# Patient Record
Sex: MALE | Race: WHITE | NOT HISPANIC OR LATINO | URBAN - METROPOLITAN AREA
[De-identification: names, ages, dates, MRNs, and addresses within clinical notes are randomized per-mention and may not be internally consistent; named-entity substitution may affect disease eponyms.]

---

## 2020-07-11 ENCOUNTER — INPATIENT (INPATIENT)
Facility: HOSPITAL | Age: 75
LOS: 8 days | Discharge: NURSING, REHAB OR LONG-TERM CARE | DRG: 689 | End: 2020-07-20
Attending: INTERNAL MEDICINE | Admitting: INTERNAL MEDICINE
Payer: MEDICARE

## 2020-07-11 VITALS
OXYGEN SATURATION: 94 % | RESPIRATION RATE: 22 BRPM | WEIGHT: 110.01 LBS | DIASTOLIC BLOOD PRESSURE: 93 MMHG | HEIGHT: 65 IN | HEART RATE: 51 BPM | SYSTOLIC BLOOD PRESSURE: 208 MMHG

## 2020-07-11 DIAGNOSIS — Z29.9 ENCOUNTER FOR PROPHYLACTIC MEASURES, UNSPECIFIED: ICD-10-CM

## 2020-07-11 DIAGNOSIS — R53.1 WEAKNESS: ICD-10-CM

## 2020-07-11 DIAGNOSIS — N39.0 URINARY TRACT INFECTION, SITE NOT SPECIFIED: ICD-10-CM

## 2020-07-11 LAB
ALBUMIN SERPL ELPH-MCNC: 3.3 G/DL — LOW (ref 3.5–5)
ALP SERPL-CCNC: 60 U/L — SIGNIFICANT CHANGE UP (ref 40–120)
ALT FLD-CCNC: 20 U/L DA — SIGNIFICANT CHANGE UP (ref 10–60)
ANION GAP SERPL CALC-SCNC: 9 MMOL/L — SIGNIFICANT CHANGE UP (ref 5–17)
APPEARANCE UR: ABNORMAL
APTT BLD: 30 SEC — SIGNIFICANT CHANGE UP (ref 27.5–35.5)
AST SERPL-CCNC: 21 U/L — SIGNIFICANT CHANGE UP (ref 10–40)
BACTERIA # UR AUTO: ABNORMAL /HPF
BASE EXCESS BLDV CALC-SCNC: 4.6 MMOL/L — HIGH (ref -2–2)
BILIRUB SERPL-MCNC: 0.6 MG/DL — SIGNIFICANT CHANGE UP (ref 0.2–1.2)
BILIRUB UR-MCNC: NEGATIVE — SIGNIFICANT CHANGE UP
BUN SERPL-MCNC: 28 MG/DL — HIGH (ref 7–18)
CALCIUM SERPL-MCNC: 8.5 MG/DL — SIGNIFICANT CHANGE UP (ref 8.4–10.5)
CHLORIDE SERPL-SCNC: 100 MMOL/L — SIGNIFICANT CHANGE UP (ref 96–108)
CO2 SERPL-SCNC: 26 MMOL/L — SIGNIFICANT CHANGE UP (ref 22–31)
COLOR SPEC: YELLOW — SIGNIFICANT CHANGE UP
COMMENT - URINE: SIGNIFICANT CHANGE UP
CREAT SERPL-MCNC: 0.83 MG/DL — SIGNIFICANT CHANGE UP (ref 0.5–1.3)
DIFF PNL FLD: ABNORMAL
EPI CELLS # UR: SIGNIFICANT CHANGE UP /HPF
GLUCOSE SERPL-MCNC: 92 MG/DL — SIGNIFICANT CHANGE UP (ref 70–99)
GLUCOSE UR QL: NEGATIVE — SIGNIFICANT CHANGE UP
HCO3 BLDV-SCNC: 29 MMOL/L — SIGNIFICANT CHANGE UP (ref 21–29)
HCT VFR BLD CALC: 43.3 % — SIGNIFICANT CHANGE UP (ref 39–50)
HGB BLD-MCNC: 14.9 G/DL — SIGNIFICANT CHANGE UP (ref 13–17)
HOROWITZ INDEX BLDV+IHG-RTO: 21 — SIGNIFICANT CHANGE UP
INR BLD: 1.13 RATIO — SIGNIFICANT CHANGE UP (ref 0.88–1.16)
KETONES UR-MCNC: ABNORMAL
LACTATE SERPL-SCNC: 0.7 MMOL/L — SIGNIFICANT CHANGE UP (ref 0.7–2)
LEUKOCYTE ESTERASE UR-ACNC: ABNORMAL
MAGNESIUM SERPL-MCNC: 2.3 MG/DL — SIGNIFICANT CHANGE UP (ref 1.6–2.6)
MCHC RBC-ENTMCNC: 29.7 PG — SIGNIFICANT CHANGE UP (ref 27–34)
MCHC RBC-ENTMCNC: 34.4 GM/DL — SIGNIFICANT CHANGE UP (ref 32–36)
MCV RBC AUTO: 86.3 FL — SIGNIFICANT CHANGE UP (ref 80–100)
NITRITE UR-MCNC: NEGATIVE — SIGNIFICANT CHANGE UP
NRBC # BLD: 0 /100 WBCS — SIGNIFICANT CHANGE UP (ref 0–0)
NT-PROBNP SERPL-SCNC: 2428 PG/ML — HIGH (ref 0–125)
PCO2 BLDV: 42 MMHG — SIGNIFICANT CHANGE UP (ref 35–50)
PH BLDV: 7.45 — SIGNIFICANT CHANGE UP (ref 7.35–7.45)
PH UR: 8 — SIGNIFICANT CHANGE UP (ref 5–8)
PHOSPHATE SERPL-MCNC: 3.6 MG/DL — SIGNIFICANT CHANGE UP (ref 2.5–4.5)
PLATELET # BLD AUTO: 235 K/UL — SIGNIFICANT CHANGE UP (ref 150–400)
PO2 BLDV: 36 MMHG — SIGNIFICANT CHANGE UP (ref 25–45)
POTASSIUM SERPL-MCNC: 3.9 MMOL/L — SIGNIFICANT CHANGE UP (ref 3.5–5.3)
POTASSIUM SERPL-SCNC: 3.9 MMOL/L — SIGNIFICANT CHANGE UP (ref 3.5–5.3)
PROT SERPL-MCNC: 7.1 G/DL — SIGNIFICANT CHANGE UP (ref 6–8.3)
PROT UR-MCNC: 100
PROTHROM AB SERPL-ACNC: 13.1 SEC — SIGNIFICANT CHANGE UP (ref 10.6–13.6)
RBC # BLD: 5.02 M/UL — SIGNIFICANT CHANGE UP (ref 4.2–5.8)
RBC # FLD: 12.8 % — SIGNIFICANT CHANGE UP (ref 10.3–14.5)
RBC CASTS # UR COMP ASSIST: ABNORMAL /HPF (ref 0–2)
SAO2 % BLDV: 67 % — SIGNIFICANT CHANGE UP (ref 67–88)
SARS-COV-2 IGG SERPL QL IA: NEGATIVE — SIGNIFICANT CHANGE UP
SARS-COV-2 IGM SERPL IA-ACNC: 0.09 INDEX — SIGNIFICANT CHANGE UP
SODIUM SERPL-SCNC: 135 MMOL/L — SIGNIFICANT CHANGE UP (ref 135–145)
SP GR SPEC: 1.01 — SIGNIFICANT CHANGE UP (ref 1.01–1.02)
TROPONIN I SERPL-MCNC: 0.02 NG/ML — SIGNIFICANT CHANGE UP (ref 0–0.04)
UROBILINOGEN FLD QL: NEGATIVE — SIGNIFICANT CHANGE UP
WBC # BLD: 11.4 K/UL — HIGH (ref 3.8–10.5)
WBC # FLD AUTO: 11.4 K/UL — HIGH (ref 3.8–10.5)
WBC UR QL: ABNORMAL /HPF (ref 0–5)

## 2020-07-11 PROCEDURE — 71045 X-RAY EXAM CHEST 1 VIEW: CPT | Mod: 26

## 2020-07-11 PROCEDURE — 99285 EMERGENCY DEPT VISIT HI MDM: CPT | Mod: 25

## 2020-07-11 RX ORDER — AMLODIPINE BESYLATE 2.5 MG/1
5 TABLET ORAL DAILY
Refills: 0 | Status: DISCONTINUED | OUTPATIENT
Start: 2020-07-11 | End: 2020-07-12

## 2020-07-11 RX ORDER — ENOXAPARIN SODIUM 100 MG/ML
40 INJECTION SUBCUTANEOUS DAILY
Refills: 0 | Status: DISCONTINUED | OUTPATIENT
Start: 2020-07-11 | End: 2020-07-20

## 2020-07-11 RX ORDER — ACETAMINOPHEN 500 MG
975 TABLET ORAL ONCE
Refills: 0 | Status: COMPLETED | OUTPATIENT
Start: 2020-07-11 | End: 2020-07-11

## 2020-07-11 RX ORDER — CEFTRIAXONE 500 MG/1
1000 INJECTION, POWDER, FOR SOLUTION INTRAMUSCULAR; INTRAVENOUS EVERY 24 HOURS
Refills: 0 | Status: DISCONTINUED | OUTPATIENT
Start: 2020-07-11 | End: 2020-07-15

## 2020-07-11 RX ORDER — CEFEPIME 1 G/1
1000 INJECTION, POWDER, FOR SOLUTION INTRAMUSCULAR; INTRAVENOUS ONCE
Refills: 0 | Status: COMPLETED | OUTPATIENT
Start: 2020-07-11 | End: 2020-07-11

## 2020-07-11 RX ADMIN — CEFEPIME 100 MILLIGRAM(S): 1 INJECTION, POWDER, FOR SOLUTION INTRAMUSCULAR; INTRAVENOUS at 11:01

## 2020-07-11 RX ADMIN — CEFTRIAXONE 100 MILLIGRAM(S): 500 INJECTION, POWDER, FOR SOLUTION INTRAMUSCULAR; INTRAVENOUS at 22:26

## 2020-07-11 RX ADMIN — Medication 975 MILLIGRAM(S): at 11:01

## 2020-07-11 NOTE — H&P ADULT - ATTENDING COMMENTS
HPI:    74 year old male with no significant medical history, lives at home, walks with cane presented to ED after he was feeling weak for 3 days. Patient says he does not take any medications and does not follow with any PCP.  Patient denies medical problems in past, does not go for routine screening test. Patient denies abdominal pain, fever, nausea,, pain, cough, palpitations , nausea , vomiting, bowel or urinary problems. Patient denies any numbness, weakness, dizziness , head ache or any other problems.    # URINARY TRACT INFECTION - PLACED ON ROCEPHIN, F/U UCX, BCX, CONDOM CATHETER PLACED, F/U BLADDER SCAN  # WEAKNESS SUSPECTED TO BE SECONDARY TO INFECTION - F/U VITB12, VIT D, TSH, FOLATE, HBA1C  # MODERATE PCM  # F/U PT/OT EVALUATION  # GI AND DVT PPX HPI:    74 year old male with no significant medical history, lives at home, walks with cane presented to ED after he was feeling weak for 3 days. Patient says he does not take any medications and does not follow with any PCP.  Patient denies medical problems in past, does not go for routine screening test. Patient denies abdominal pain, fever, nausea,, pain, cough, palpitations , nausea , vomiting, bowel or urinary problems. Patient denies any numbness, weakness, dizziness , head ache or any other problems.    # URINARY TRACT INFECTION - PLACED ON ROCEPHIN, F/U UCX, BCX, CONDOM CATHETER PLACED, F/U BLADDER SCAN  # WEAKNESS SUSPECTED TO BE SECONDARY TO INFECTION - F/U VITB12, VIT D, TSH, FOLATE, HBA1C  # MODERATE PCM  # F/U PT/OT EVALUATION  # GI AND DVT PPX    UNIQUE GILL M.D. COVERING FOR NITISH GILL M.D.

## 2020-07-11 NOTE — H&P ADULT - PROBLEM SELECTOR PLAN 1
Patient presented after weakness.  UA is positive for WBC 11-25, Leukocyte esterase moderate.  s/p condem catheter placement in ED  -WBC 11.40.  - Follow urine cultures.  -will start the patient on Rocephin.

## 2020-07-11 NOTE — H&P ADULT - PROBLEM SELECTOR PLAN 3
IMPROVE VTE Individual Risk Assessment  RISK                                                                Points  [  ] Previous VTE                                                  3  [  ] Thrombophilia                                               2  [  ] Lower limb paralysis                                      2        (unable to hold up >15 seconds)    [  ] Current Cancer                                              2         (within 6 months)  [x  ] Immobilization > 24 hrs                                1  [  ] ICU/CCU stay > 24 hours                              1  [x  ] Age > 60                                                      1  IMPROVE VTE Score : 2  will start the patient 0n lovenox.

## 2020-07-11 NOTE — H&P ADULT - PROBLEM SELECTOR PLAN 2
Patient presented with Weakness.  can be secondary to infection.  follow urine and blood cx.  -PT eval  check TSH, HbA1c, vitamin B 12, Folate, vitamin D levels.

## 2020-07-11 NOTE — ED PROVIDER NOTE - PHYSICAL EXAMINATION
GENERAL: well appearing, no acute distress   HEAD: atraumatic   EYES: EOMI, pink conjunctiva   ENT: moist oral mucosa   CARDIAC: RRR, no edema, distal pulses present   RESPIRATORY: lungs CTAB, no increased work of breathing   GASTROINTESTINAL: no abdominal tenderness, no rebound or guarding, bowel sounds presents  GENITOURINARY: no CVA tenderness   MUSCULOSKELETAL: no deformity   NEUROLOGICAL: alert, oriented to person and situation but not time, following commands, strength and sensation intact, no ataxia    SKIN: intact   PSYCHIATRIC: cooperative  HEME LYMPH: no lymphadenopathy

## 2020-07-11 NOTE — H&P ADULT - NSHPPHYSICALEXAM_GEN_ALL_CORE
Vital Signs Last 24 Hrs  T(C): 37.7 (11 Jul 2020 10:22), Max: 37.7 (11 Jul 2020 10:22)  T(F): 99.9 (11 Jul 2020 10:22), Max: 99.9 (11 Jul 2020 10:22)  HR: 73 (11 Jul 2020 11:03) (51 - 73)  BP: 157/77 (11 Jul 2020 11:03) (157/77 - 208/93)  BP(mean): --  RR: 18 (11 Jul 2020 11:03) (18 - 22)  SpO2: 96% (11 Jul 2020 11:03) (94% - 96%)    CONSTITUTIONAL: Elderly male in no apparent distress.  ENMT: Airway patent, Nasal mucosa clear. Mouth with normal mucosa.  EYES: Clear bilaterally, pupils equal, round and reactive to light. EOMI.  CARDIAC: Normal rate, regular rhythm.  Heart sounds S1, S2.  No murmurs, rubs or gallops   RESPIRATORY: Breath sounds clear and equal bilaterally. No wheezes, rales or rhonchi  MUSCULOSKELETAL: Spine appears normal, range of motion is not limited,   ABDOMEN; soft, non tender, non distended.  EXTREMITIES: No edema, cyanosis or deformity   NEUROLOGICAL: Alert and oriented, no new deficits.  SKIN: No rash, skin turgor

## 2020-07-11 NOTE — ED PROVIDER NOTE - PROGRESS NOTE DETAILS
EKG - nsr, rate 53, 1st deg AV block, inferior Q waves, lateral twi EKG - nsr, rate 53, 1st deg AV block, inferior Q waves, lateral twi  labs - WBC 11, bnp 2000  CXR - lungs clear   VS improved w/o intervention. Will admit to unattached Dr Moss for weakness. Endorsed to MAR.

## 2020-07-11 NOTE — H&P ADULT - ASSESSMENT
74 year old male with no significant medical history, lives at home, walks with cane presented to ED after he was feeling weak for 3 days. Patient says he does not take any medications and does not follow with any PCP. Patient is admitted for treatment for UTI.

## 2020-07-11 NOTE — ED PROVIDER NOTE - NS ED ROS FT
CONSTITUTIONAL: no fever, no chills   EYES: no visual changes, no eye pain   ENMT: no nasal congestion, no throat pain  CARDIOVASCULAR: no chest pain, no edema, no palpitations   RESPIRATORY: no shortness of breath, no cough   GASTROINTESTINAL: no abdominal pain, no nausea, no vomiting, no diarrhea, no constipation   GENITOURINARY: no dysuria, no frequency  MUSCULOSKELETAL: no joint pains, no myalgias, no back pain   SKIN: no rashes  NEUROLOGICAL: +weakness, no headache, no dizziness, no slurred speech, no syncope   PSYCHIATRIC: no known mental health illness   HEME/LYMPH: no lymphadenopathy      All other ROS negative except as per HPI 36.7

## 2020-07-11 NOTE — ED PROVIDER NOTE - DATE/TIME 1
[Dear  ___] : Dear  [unfilled], [Courtesy Letter:] : I had the pleasure of seeing your patient, [unfilled], in my office today. [Please see my note below.] : Please see my note below. [Referral Closing:] : Thank you very much for seeing this patient.  If you have any questions, please do not hesitate to contact me. [Sincerely,] : Sincerely, [FreeTextEntry3] : Dr. Evelia Martinez MD\par Fellow\par Department of Hematology, Oncology, and Bone Marrow Transplant\par [facility]\par \par [facility]\par \par Gris Laboy MD, MPH\par Head, Developmental [employer]\par Pediatric Hematology-Oncology and Stem Cell Transplant\par [facility] \par , Department of Pediatrics\par [facility] \par Tel: [phone]\par Email: [email or web address] [FreeTextEntry2] : Dr. Lachelle Candelario, \par [address], [address]\[place] [address] 11-Jul-2020 11:49

## 2020-07-11 NOTE — ED ADULT TRIAGE NOTE - CHIEF COMPLAINT QUOTE
As per brother patient has not been eating much and too weak to get out of bed most days.  Both brothers unkempt and domicile is untidy as per EMS.  Placed in iso room in case of infestation.  EKG/FS/CORE temp requested in main ED.  Will request SW

## 2020-07-11 NOTE — ED ADULT NURSE NOTE - OBJECTIVE STATEMENT
Patient came to the ED alert and verbally responsive for generalized weakness. Pt appears to be disheveled at triage, pt was cleaned, undress and placed on a gown. Patient came to the ED alert and verbally responsive for generalized weakness. Pt appears to be disheveled at triage, pt was cleaned, undress and placed on a gown. Scrotal area + swelling, + redness

## 2020-07-12 ENCOUNTER — TRANSCRIPTION ENCOUNTER (OUTPATIENT)
Age: 75
End: 2020-07-12

## 2020-07-12 LAB
ALBUMIN SERPL ELPH-MCNC: 3.4 G/DL — LOW (ref 3.5–5)
ALP SERPL-CCNC: 61 U/L — SIGNIFICANT CHANGE UP (ref 40–120)
ALT FLD-CCNC: 19 U/L DA — SIGNIFICANT CHANGE UP (ref 10–60)
ANION GAP SERPL CALC-SCNC: 10 MMOL/L — SIGNIFICANT CHANGE UP (ref 5–17)
AST SERPL-CCNC: 18 U/L — SIGNIFICANT CHANGE UP (ref 10–40)
BASOPHILS # BLD AUTO: 0.03 K/UL — SIGNIFICANT CHANGE UP (ref 0–0.2)
BASOPHILS NFR BLD AUTO: 0.2 % — SIGNIFICANT CHANGE UP (ref 0–2)
BILIRUB SERPL-MCNC: 0.7 MG/DL — SIGNIFICANT CHANGE UP (ref 0.2–1.2)
BUN SERPL-MCNC: 24 MG/DL — HIGH (ref 7–18)
CALCIUM SERPL-MCNC: 8.7 MG/DL — SIGNIFICANT CHANGE UP (ref 8.4–10.5)
CHLORIDE SERPL-SCNC: 100 MMOL/L — SIGNIFICANT CHANGE UP (ref 96–108)
CHOLEST SERPL-MCNC: 145 MG/DL — SIGNIFICANT CHANGE UP (ref 10–199)
CO2 SERPL-SCNC: 23 MMOL/L — SIGNIFICANT CHANGE UP (ref 22–31)
CREAT SERPL-MCNC: 0.82 MG/DL — SIGNIFICANT CHANGE UP (ref 0.5–1.3)
EOSINOPHIL # BLD AUTO: 0.07 K/UL — SIGNIFICANT CHANGE UP (ref 0–0.5)
EOSINOPHIL NFR BLD AUTO: 0.5 % — SIGNIFICANT CHANGE UP (ref 0–6)
GLUCOSE SERPL-MCNC: 99 MG/DL — SIGNIFICANT CHANGE UP (ref 70–99)
HCT VFR BLD CALC: 46 % — SIGNIFICANT CHANGE UP (ref 39–50)
HDLC SERPL-MCNC: 53 MG/DL — SIGNIFICANT CHANGE UP
HGB BLD-MCNC: 15.6 G/DL — SIGNIFICANT CHANGE UP (ref 13–17)
IMM GRANULOCYTES NFR BLD AUTO: 0.4 % — SIGNIFICANT CHANGE UP (ref 0–1.5)
LIPID PNL WITH DIRECT LDL SERPL: 81 MG/DL — SIGNIFICANT CHANGE UP
LYMPHOCYTES # BLD AUTO: 0.73 K/UL — LOW (ref 1–3.3)
LYMPHOCYTES # BLD AUTO: 5.2 % — LOW (ref 13–44)
MAGNESIUM SERPL-MCNC: 2.4 MG/DL — SIGNIFICANT CHANGE UP (ref 1.6–2.6)
MCHC RBC-ENTMCNC: 29.8 PG — SIGNIFICANT CHANGE UP (ref 27–34)
MCHC RBC-ENTMCNC: 33.9 GM/DL — SIGNIFICANT CHANGE UP (ref 32–36)
MCV RBC AUTO: 87.8 FL — SIGNIFICANT CHANGE UP (ref 80–100)
MONOCYTES # BLD AUTO: 1.2 K/UL — HIGH (ref 0–0.9)
MONOCYTES NFR BLD AUTO: 8.6 % — SIGNIFICANT CHANGE UP (ref 2–14)
NEUTROPHILS # BLD AUTO: 11.92 K/UL — HIGH (ref 1.8–7.4)
NEUTROPHILS NFR BLD AUTO: 85.1 % — HIGH (ref 43–77)
NRBC # BLD: 0 /100 WBCS — SIGNIFICANT CHANGE UP (ref 0–0)
PHOSPHATE SERPL-MCNC: 3.9 MG/DL — SIGNIFICANT CHANGE UP (ref 2.5–4.5)
PLATELET # BLD AUTO: 248 K/UL — SIGNIFICANT CHANGE UP (ref 150–400)
POTASSIUM SERPL-MCNC: 3.4 MMOL/L — LOW (ref 3.5–5.3)
POTASSIUM SERPL-SCNC: 3.4 MMOL/L — LOW (ref 3.5–5.3)
PROT SERPL-MCNC: 6.9 G/DL — SIGNIFICANT CHANGE UP (ref 6–8.3)
RBC # BLD: 5.24 M/UL — SIGNIFICANT CHANGE UP (ref 4.2–5.8)
RBC # FLD: 12.7 % — SIGNIFICANT CHANGE UP (ref 10.3–14.5)
SARS-COV-2 RNA SPEC QL NAA+PROBE: SIGNIFICANT CHANGE UP
SODIUM SERPL-SCNC: 133 MMOL/L — LOW (ref 135–145)
TOTAL CHOLESTEROL/HDL RATIO MEASUREMENT: 2.7 RATIO — LOW (ref 3.4–9.6)
TRIGL SERPL-MCNC: 54 MG/DL — SIGNIFICANT CHANGE UP (ref 10–149)
TSH SERPL-MCNC: 1.39 UU/ML — SIGNIFICANT CHANGE UP (ref 0.34–4.82)
WBC # BLD: 14.01 K/UL — HIGH (ref 3.8–10.5)
WBC # FLD AUTO: 14.01 K/UL — HIGH (ref 3.8–10.5)

## 2020-07-12 PROCEDURE — 74019 RADEX ABDOMEN 2 VIEWS: CPT | Mod: 26

## 2020-07-12 RX ORDER — OXYCODONE AND ACETAMINOPHEN 5; 325 MG/1; MG/1
2 TABLET ORAL EVERY 6 HOURS
Refills: 0 | Status: DISCONTINUED | OUTPATIENT
Start: 2020-07-12 | End: 2020-07-18

## 2020-07-12 RX ORDER — POTASSIUM CHLORIDE 20 MEQ
40 PACKET (EA) ORAL ONCE
Refills: 0 | Status: COMPLETED | OUTPATIENT
Start: 2020-07-12 | End: 2020-07-12

## 2020-07-12 RX ORDER — OXYCODONE AND ACETAMINOPHEN 5; 325 MG/1; MG/1
1 TABLET ORAL EVERY 4 HOURS
Refills: 0 | Status: DISCONTINUED | OUTPATIENT
Start: 2020-07-12 | End: 2020-07-18

## 2020-07-12 RX ORDER — ACETAMINOPHEN 500 MG
650 TABLET ORAL EVERY 6 HOURS
Refills: 0 | Status: DISCONTINUED | OUTPATIENT
Start: 2020-07-12 | End: 2020-07-20

## 2020-07-12 RX ORDER — POLYETHYLENE GLYCOL 3350 17 G/17G
17 POWDER, FOR SOLUTION ORAL DAILY
Refills: 0 | Status: DISCONTINUED | OUTPATIENT
Start: 2020-07-12 | End: 2020-07-13

## 2020-07-12 RX ORDER — POTASSIUM CHLORIDE 20 MEQ
10 PACKET (EA) ORAL
Refills: 0 | Status: COMPLETED | OUTPATIENT
Start: 2020-07-12 | End: 2020-07-12

## 2020-07-12 RX ORDER — AMLODIPINE BESYLATE 2.5 MG/1
10 TABLET ORAL DAILY
Refills: 0 | Status: DISCONTINUED | OUTPATIENT
Start: 2020-07-13 | End: 2020-07-20

## 2020-07-12 RX ORDER — LOSARTAN POTASSIUM 100 MG/1
25 TABLET, FILM COATED ORAL ONCE
Refills: 0 | Status: COMPLETED | OUTPATIENT
Start: 2020-07-12 | End: 2020-07-12

## 2020-07-12 RX ADMIN — Medication 650 MILLIGRAM(S): at 04:43

## 2020-07-12 RX ADMIN — Medication 100 MILLIEQUIVALENT(S): at 09:42

## 2020-07-12 RX ADMIN — OXYCODONE AND ACETAMINOPHEN 1 TABLET(S): 5; 325 TABLET ORAL at 20:48

## 2020-07-12 RX ADMIN — OXYCODONE AND ACETAMINOPHEN 1 TABLET(S): 5; 325 TABLET ORAL at 04:42

## 2020-07-12 RX ADMIN — POLYETHYLENE GLYCOL 3350 17 GRAM(S): 17 POWDER, FOR SOLUTION ORAL at 12:40

## 2020-07-12 RX ADMIN — Medication 100 MILLIEQUIVALENT(S): at 11:46

## 2020-07-12 RX ADMIN — Medication 100 MILLIEQUIVALENT(S): at 10:47

## 2020-07-12 NOTE — DISCHARGE NOTE PROVIDER - NSDCCPCAREPLAN_GEN_ALL_CORE_FT
PRINCIPAL DISCHARGE DIAGNOSIS  Diagnosis: Weakness  Assessment and Plan of Treatment:       SECONDARY DISCHARGE DIAGNOSES  Diagnosis: UTI (urinary tract infection)  Assessment and Plan of Treatment: Continue taking your antibiotics as prescribed and monitor for signs and symptoms of infection, such as fever or chills, burning/pain with urination, urinary frequency/hesitancy, cloudy or bloody urine. PRINCIPAL DISCHARGE DIAGNOSIS  Diagnosis: UTI (urinary tract infection)  Assessment and Plan of Treatment: Labs on admission was notable for UTI and was started on IV Rocephin antibiotic. Urine culture was positive for Enterococcus faecalis which was sensitive to Cipro. Blood cultures were negative. Chest x-ray was negative.   Continue Cipro oral antibiotic 500mg twice a day for 7 more days to complete course. Follow up with your PCP for further management.      SECONDARY DISCHARGE DIAGNOSES  Diagnosis: Bilateral hydronephrosis  Assessment and Plan of Treatment: Renal US showed each kidney measures 10.2 cm long dimension. There is mild right and moderate left hydronephrosis. No shadowing renal calculus solid or cystic mass bilaterally. No perirenal collections.  Staley was placed by urology team. Started on flomax. Testicular US done showed normal bilateral testes. Large right hydrocele. CT abdomen showed markedly enlarged prostate. Enhancing mass at the bladder base may represent intravesicular extension of the prostate however bladder mass cannot be excluded. Recommend cystoscopy. Mild right hydroureteronephrosis. Large right extrarenal pelvis. Small left extrarenal pelvis.  Urology recommended to continue staley upon discharge and follow up with Dr. Sanchez as outpatient for cystoscopy and further management. Follow up with PCP for further evaluation and management.    Diagnosis: Scrotal hernia  Assessment and Plan of Treatment: Testicular US done showed normal bilateral testes. Large right hydrocele. Large bowel containing left inguinal-scrotal hernia.  Surgery team consulted for evaluation of hernia and recommended for nonemergent surgery as outpatient.    Follow up with your PCP. Follow up with surgery team if needed for surgery evaluation of hernia.    Diagnosis: Constipation  Assessment and Plan of Treatment: X-ray abdomen showed colonic stool present and treated with bowel   regimen. Repeat x-ray abdomen showed improvement in symptoms. Recommend to continue bowel regimen to prevent constipation. Follow up with your PCP for further management    Diagnosis: Urinary retention  Assessment and Plan of Treatment: Staley placed by urology team and started on flomax for urinary retention. Recommend to keep staley in place and follow up with urologist, Dr. Sanchez as outpatient for further evaluation and for staley removal.    Diagnosis: Pancreatic cyst  Assessment and Plan of Treatment: CT abdomen 9 mm pancreatic body cyst.    MRI done to evaluate pancreatic cysts showed 1.4 cm and 3.0 cm cysts in the pancreatic body and uncinate process, respectively, without concerning features or high-risk stigmata. Initial MRI surveillance every 6 months for a total of 2 years is recommended. If stable, the follow-up interval can be increased. Follow up with your PCP for further management.

## 2020-07-12 NOTE — PHYSICAL THERAPY INITIAL EVALUATION ADULT - ADDITIONAL COMMENTS
Patient reports living in a 2 story private house with pt on 2nd floor and a brother lives in the first floor. Patient reports being independent with ADL's, occasionally uses cane for community ambulation, no assistive device within home. There are 14 steps to access second floor but once on the 2nd floor, bedroom, toilet and kitchen are all in same floor level.

## 2020-07-12 NOTE — CHART NOTE - NSCHARTNOTEFT_GEN_A_CORE
Patient noted to have a mild bleeding from urethral meatus. He is not in acute distress. Also, severe testicular swelling is noted. Testicular u/s is ordered by primary team. Patient noted to have a mild bleeding from urethral meatus. He is not in acute distress. Also, severe testicular swelling is noted. Testicular u/s is already ordered by primary team.    Patient BP remained elevated after the initial encounter. Losartan 25mg stat, and increased amlodipine to 10mg for tomorrow am. Patient noted to have bleeding from the urethral meatus throughout the night. He is not in acute distress. Also, severe testicular swelling is noted. Testicular u/s is already ordered by primary team.    Patient BP remained elevated after the initial encounter. Losartan 25mg stat, and increased amlodipine to 10mg for tomorrow am. Patient noted to have a mild bleeding from the urethral meatus. He is not in acute distress. Also, severe testicular swelling is noted. Testicular u/s is already ordered by primary team.    Patient BP remained elevated after the initial encounter. Losartan 25mg stat, and increased amlodipine to 10mg for tomorrow am.    UPDATE  - patient had more bleeding from the urethral meatus throughout the night to cover the sheet 3x (but likely urine + blood mixture). Patient's h/h unchanged from the previous day.

## 2020-07-12 NOTE — DISCHARGE NOTE PROVIDER - HOSPITAL COURSE
Mr. Moscoso is a 74 year old male with no significant medical history, lives at home, walks with cane presented to ED after he was feeling weak for 3 days. Patient says he does not take any medications and does not follow with any PCP.  Patient denies medical problems in past, does not go for routine screening test. Patient denies abdominal pain, fever, nausea,, pain, cough, palpitations , nausea , vomiting, bowel or urinary problems. Patient denies any numbness, weakness, dizziness , head ache or any other problems.         His labs on admission was notable for UTI and was started on Rocephin and his urine culture result was=============    Blood culture NGTD        He was also noted to have scrotal swelling for which an ultrasound of his testes was ordered.        He was evaluated by PT and anticipated JIMMY w/ rolling walker 5inch wheels.         His COVID 19 IgG antibody on was negative on 7/11/2020.          Incomplete: Patient is a 74 year old male, with no PMH, who presented to the ED after he was feeling weak for 3 days.          His labs on admission was notable for UTI and was started on Rocephin IV antibiotics. Urine culture was positive for Enterococcus faecalis which was sensitive to Cipro.    Blood cultures were negative. Evaluated by PT who recommended for [facility]. Chest x-ray was negative. X-ray abdomen showed colonic stool present and treated with bowel     regimen. Renal US showed each kidney measures 10.2 cm long dimension. There is mild right and moderate left hydronephrosis. No shadowing renal calculus solid or cystic mass bilaterally. No perirenal collections.    Staley was placed by urology team. Started on flomax. Testicular US done showed normal bilateral testes. Large right hydrocele. Large bowel containing left inguinal-scrotal hernia.    Surgery team consulted for evaluation of hernia and recommended for nonemergent surgery as outpatient. CT abdomen showed markedly enlarged prostate. Enhancing mass at the bladder base may represent intravesicular extension of the prostate however bladder mass cannot be excluded. Recommend cystoscopy. Mild right hydroureteronephrosis. Large right extrarenal pelvis. Small left extrarenal pelvis.    Left inguinal hernia containing large and small bowel loops without obstruction. Sigmoid diverticulosis without diverticulitis. Mild constipation.    9 mm pancreatic body cyst. Urology recommended to continue staley upon discharge and follow up with Dr. Sanchez as outpatient for cystoscopy and further management.     MRI done to evaluate pancreatic cysts showed 1.4 cm and 3.0 cm cysts in the pancreatic body and uncinate process, respectively, without concerning features or high-risk stigmata. Initial MRI surveillance every 6 months for a total of 2 years is recommended. If stable, the follow-up interval can be increased.        Patient is medically stable for discharge to [facility]. Case was discussed with attending.

## 2020-07-12 NOTE — PHYSICAL THERAPY INITIAL EVALUATION ADULT - PERTINENT HX OF CURRENT PROBLEM, REHAB EVAL
74 year old male with no significant medical history, lives at home, walks with cane presented to ED after he was feeling weak for 3 days

## 2020-07-12 NOTE — PHYSICAL THERAPY INITIAL EVALUATION ADULT - GENERAL OBSERVATIONS, REHAB EVAL
Patient was seen for PT evaluation today. EMR, laboratory and radiology results reviewed. Pt received sitting at edge of bed, NAD, IV on R in situ, telemetry on

## 2020-07-12 NOTE — DISCHARGE NOTE PROVIDER - CARE PROVIDER_API CALL
Frederic Sanchez  UROLOGY  [address] [place] [address]  Phone: [phone]  Fax: [phone]  Follow Up Time:

## 2020-07-12 NOTE — DISCHARGE NOTE PROVIDER - NSDCCAREPROVSEEN_GEN_ALL_CORE_FT
Rod Moss #  DC PLAN IN AM TO COMPLETE ANTIBIOTIC ORALLY ( CIPROFLOXACIN ) TO JIMMY   # ACUTE METABOLIC ENCEPHALOPATHY DUE TO INFECTION - RESOLVING - CAN DC ALL ANTIPSYCHOTICS NOW AND OBSERVE  # URINARY TRACT INFECTION, URINARY RETENTION, BPH, SUSPECT CANCER PROSTATE, BLADDER TUMOR , BILATERAL HYDRONEPHROSIS, UTI WITH E. FAECALIS, - S/P RENAL  ULTRASOUND, ON CIPROFLOXACIN, ON BILL,  FLOMAX - UROLOGY CONSULT IN PROGRESS, OUT PATIENT CYSTOSCOPY WHEN STABLE FOR FURTHER  EVALUATION AND MANAGEMENT   # ONCOLOGY CONSULT TO F/UP  # PANCREATIC CYSTS   # CONSTIPATION  ON MIRALAX, SENNA  # RIGHT SIDED HYDROCELE, AND LEFT INGUINAL HERNIA - UROLOGY CONSULT IN PROGRESS, SURGERY CONSULT IN PROGRESS - REPAIR AS AN OUT PATIENT WHEN STABLE  # MODERATE PCM  # PT/OT EVALUATION RECOMMENDED SUBACUTE REHAB  # CASE DISCUSSED EXTENSIVELY WITH BROTHER LAWRENCE KU, [phone]. PATIENT AND BROTHER AGREED TO SUBACUTE REHAB

## 2020-07-12 NOTE — CHART NOTE - NSCHARTNOTEFT_GEN_A_CORE
CC: pt c/o pain and constipatiopn CC: pt c/o pain and constipation  pt moaning, reports also of inability to move his bowels when he tries; denies abdominal pain/n/v.    Brief exam  General: NAD  Resp: clear  abd: soft, NTND, +S  : swollen scrotum  Neuro: A&Ox3    Vital Signs Last 24 Hrs  T(C): 37 (12 Jul 2020 05:25), Max: 37.7 (11 Jul 2020 10:22)  T(F): 98.6 (12 Jul 2020 05:25), Max: 99.9 (11 Jul 2020 10:22)  HR: 93 (12 Jul 2020 05:25) (51 - 93)  BP: 149/90 (12 Jul 2020 05:25) (149/90 - 208/93)  BP(mean): 99 (11 Jul 2020 15:45) (99 - 99)  RR: 18 (12 Jul 2020 05:25) (18 - 22)  SpO2: 98% (12 Jul 2020 05:25) (94% - 98%)    74 year old Man, no known pmhx who presented from home to [facility] ED on 7/11 with c/o 3 day hx of progressive weakness found to have UTI. Pt now also with:  Acute pain likely due to swollen scrotum  constipation   - percocet prn  - elevate scrotum  - dulcolax 5mg bid PRN  - con't current tx plan for UTI

## 2020-07-12 NOTE — PHYSICAL THERAPY INITIAL EVALUATION ADULT - CRITERIA FOR SKILLED THERAPEUTIC INTERVENTIONS
functional limitations in following categories/rehab potential/predicted duration of therapy intervention/anticipated equipment needs at discharge/anticipated discharge recommendation/risk reduction/prevention/therapy frequency/impairments found

## 2020-07-12 NOTE — PHYSICAL THERAPY INITIAL EVALUATION ADULT - GAIT DEVIATIONS NOTED, PT EVAL
holds walker far forward than body/decreased alan/decreased step length/decreased stride length/increased stride width

## 2020-07-12 NOTE — PROGRESS NOTE ADULT - SUBJECTIVE AND OBJECTIVE BOX
Patient is a 74y old  Male who presents with a chief complaint of Weakness (11 Jul 2020 12:47)    PATIENT IS SEEN AND EXAMINED IN MEDICAL FLOOR.    ALLERGIES:  No Known Allergies      Daily     Daily     VITALS:    Vital Signs Last 24 Hrs  T(C): 36.8 (12 Jul 2020 15:42), Max: 37.1 (11 Jul 2020 22:00)  T(F): 98.2 (12 Jul 2020 15:42), Max: 98.7 (11 Jul 2020 22:00)  HR: 58 (12 Jul 2020 15:42) (52 - 93)  BP: 145/75 (12 Jul 2020 15:42) (124/62 - 168/77)  BP(mean): --  RR: 18 (12 Jul 2020 15:42) (17 - 18)  SpO2: 96% (12 Jul 2020 15:42) (96% - 98%)    LABS:    CBC Full  -  ( 12 Jul 2020 06:03 )  WBC Count : 14.01 K/uL  RBC Count : 5.24 M/uL  Hemoglobin : 15.6 g/dL  Hematocrit : 46.0 %  Platelet Count - Automated : 248 K/uL  Mean Cell Volume : 87.8 fl  Mean Cell Hemoglobin : 29.8 pg  Mean Cell Hemoglobin Concentration : 33.9 gm/dL  Auto Neutrophil # : 11.92 K/uL  Auto Lymphocyte # : 0.73 K/uL  Auto Monocyte # : 1.20 K/uL  Auto Eosinophil # : 0.07 K/uL  Auto Basophil # : 0.03 K/uL  Auto Neutrophil % : 85.1 %  Auto Lymphocyte % : 5.2 %  Auto Monocyte % : 8.6 %  Auto Eosinophil % : 0.5 %  Auto Basophil % : 0.2 %    PT/INR - ( 11 Jul 2020 10:50 )   PT: 13.1 sec;   INR: 1.13 ratio         PTT - ( 11 Jul 2020 10:50 )  PTT:30.0 sec  07-12    133<L>  |  100  |  24<H>  ----------------------------<  99  3.4<L>   |  23  |  0.82    Ca    8.7      12 Jul 2020 06:03  Phos  3.9     07-12  Mg     2.4     07-12    TPro  6.9  /  Alb  3.4<L>  /  TBili  0.7  /  DBili  x   /  AST  18  /  ALT  19  /  AlkPhos  61  07-12    CAPILLARY BLOOD GLUCOSE        CARDIAC MARKERS ( 11 Jul 2020 10:50 )  0.019 ng/mL / x     / x     / x     / x          LIVER FUNCTIONS - ( 12 Jul 2020 06:03 )  Alb: 3.4 g/dL / Pro: 6.9 g/dL / ALK PHOS: 61 U/L / ALT: 19 U/L DA / AST: 18 U/L / GGT: x           Creatinine Trend: 0.82<--, 0.83<--  I&O's Summary          .Blood Blood  07-11 @ 15:19   No growth to date.  --  --      .Blood Blood  07-11 @ 15:18   No growth to date.  --  --    MEDICATIONS:    MEDICATIONS  (STANDING):  amLODIPine   Tablet 5 milliGRAM(s) Oral daily  cefTRIAXone   IVPB 1000 milliGRAM(s) IV Intermittent every 24 hours  enoxaparin Injectable 40 milliGRAM(s) SubCutaneous daily  polyethylene glycol 3350 17 Gram(s) Oral daily  potassium chloride    Tablet ER 40 milliEquivalent(s) Oral once      MEDICATIONS  (PRN):  acetaminophen   Tablet .. 650 milliGRAM(s) Oral every 6 hours PRN Mild Pain (1 - 3)  bisacodyl 5 milliGRAM(s) Oral every 12 hours PRN Constipation  oxycodone    5 mG/acetaminophen 325 mG 1 Tablet(s) Oral every 4 hours PRN Moderate Pain (4 - 6)  oxycodone    5 mG/acetaminophen 325 mG 2 Tablet(s) Oral every 6 hours PRN Severe Pain (7 - 10)      REVIEW OF SYSTEMS:                           ALL ROS DONE [ X   ]    CONSTITUTIONAL:  LETHARGIC [   ], FEVER [   ], UNRESPONSIVE [   ]  CVS:  CP  [   ], SOB, [   ], PALPITATIONS [   ], DIZZYNESS [   ]  RS: COUGH [   ], SPUTUM [   ]  GI: ABDOMINAL PAIN [   ], NAUSEA [   ], VOMITINGS [   ], DIARRHEA [   ], CONSTIPATION [   ]  :  DYSURIA [   ], NOCTURIA [   ], INCREASED FREQUENCY [   ], DRIBLING [   ],  SKELETAL: PAINFUL JOINTS [   ], SWOLLEN JOINTS [   ], NECK ACHE [   ], LOW BACK ACHE [   ],  SKIN : ULCERS [   ], RASH [   ], ITCHING [   ]  CNS: HEAD ACHE [   ], DOUBLE VISION [   ], BLURRED VISION [   ], AMS / CONFUSION [   ], SEIZURES [   ], WEAKNESS [   ],TINGLING / NUMBNESS [   ]    PHYSICAL EXAMINATION:  GENERAL APPEARANCE: NO DISTRESS  HEENT:  NO PALLOR, NO  JVD,  NO   NODES, NECK SUPPLE  CVS: S1 +, S2 +,   RS: AEEB,  OCCASIONAL  RALES +,   NO RONCHI  ABD: SOFT, NT, NO, BS +  EXT: NO PE  SKIN: WARM,  SWOLLEN SCROTUM  SKELETAL:  ROM ACCEPTABLE  CNS:  AAO X 2 ,   DEFICITS    RADIOLOGY :    < from: Xray Chest 1 View- PORTABLE-Urgent (07.11.20 @ 11:38) >  EXAM:  XR CHEST PORTABLE URGENT 1V                            PROCEDURE DATE:  07/11/2020          INTERPRETATION:  Chest one view    HISTORY: Weakness    COMPARISON STUDY: None available    Frontal expiratory view of the chest shows the heart to be borderline in size. The lungs are clear and there is no evidence of pneumothorax nor pleural effusion. Skin folds project over the upper lobes.    IMPRESSION:  No active pulmonary disease.    < end of copied text >      ASSESSMENT :     Weakness      PLAN:  HPI:  74 year old male with no significant medical history, lives at home, walks with cane presented to ED after he was feeling weak for 3 days. Patient says he does not take any medications and does not follow with any PCP.  Patient denies medical problems in past, does not go for routine screening test. Patient denies abdominal pain, fever, nausea,, pain, cough, palpitations , nausea , vomiting, bowel or urinary problems. Patient denies any numbness, weakness, dizziness , head ache or any other problems. (11 Jul 2020 12:47)    # URINARY TRACT INFECTION - PLACED ON ROCEPHIN, F/U UCX, BCX, CONDOM CATHETER PLACED, F/U BLADDER SCAN  # WEAKNESS SUSPECTED TO BE SECONDARY TO INFECTION - F/U VITB12, VIT D, TSH, FOLATE, HBA1C  # SCROTAL SWELLING - F/U TESTICULAR ULTRASOUND  # CONSTIPATION - STARTED ON MIRALAX, DULCOLAX  # MODERATE PCM  # F/U PT/OT EVALUATION  # GI AND DVT PPX    UNIQUE GILL M.D. COVERING FOR NITISH GILL M.D.

## 2020-07-13 DIAGNOSIS — K59.00 CONSTIPATION, UNSPECIFIED: ICD-10-CM

## 2020-07-13 DIAGNOSIS — N50.89 OTHER SPECIFIED DISORDERS OF THE MALE GENITAL ORGANS: ICD-10-CM

## 2020-07-13 LAB
A1C WITH ESTIMATED AVERAGE GLUCOSE RESULT: 5.6 % — SIGNIFICANT CHANGE UP (ref 4–5.6)
ANION GAP SERPL CALC-SCNC: 10 MMOL/L — SIGNIFICANT CHANGE UP (ref 5–17)
BUN SERPL-MCNC: 22 MG/DL — HIGH (ref 7–18)
CALCIUM SERPL-MCNC: 8.6 MG/DL — SIGNIFICANT CHANGE UP (ref 8.4–10.5)
CHLORIDE SERPL-SCNC: 103 MMOL/L — SIGNIFICANT CHANGE UP (ref 96–108)
CO2 SERPL-SCNC: 23 MMOL/L — SIGNIFICANT CHANGE UP (ref 22–31)
CREAT SERPL-MCNC: 0.93 MG/DL — SIGNIFICANT CHANGE UP (ref 0.5–1.3)
ESTIMATED AVERAGE GLUCOSE: 114 MG/DL — SIGNIFICANT CHANGE UP (ref 68–114)
FOLATE SERPL-MCNC: 10.5 NG/ML — SIGNIFICANT CHANGE UP
GLUCOSE SERPL-MCNC: 117 MG/DL — HIGH (ref 70–99)
HCT VFR BLD CALC: 44.5 % — SIGNIFICANT CHANGE UP (ref 39–50)
HCV AB S/CO SERPL IA: 0.12 S/CO — SIGNIFICANT CHANGE UP (ref 0–0.99)
HCV AB SERPL-IMP: SIGNIFICANT CHANGE UP
HGB BLD-MCNC: 15.6 G/DL — SIGNIFICANT CHANGE UP (ref 13–17)
MCHC RBC-ENTMCNC: 30.1 PG — SIGNIFICANT CHANGE UP (ref 27–34)
MCHC RBC-ENTMCNC: 35.1 GM/DL — SIGNIFICANT CHANGE UP (ref 32–36)
MCV RBC AUTO: 85.9 FL — SIGNIFICANT CHANGE UP (ref 80–100)
NRBC # BLD: 0 /100 WBCS — SIGNIFICANT CHANGE UP (ref 0–0)
PLATELET # BLD AUTO: 245 K/UL — SIGNIFICANT CHANGE UP (ref 150–400)
POTASSIUM SERPL-MCNC: 3.7 MMOL/L — SIGNIFICANT CHANGE UP (ref 3.5–5.3)
POTASSIUM SERPL-SCNC: 3.7 MMOL/L — SIGNIFICANT CHANGE UP (ref 3.5–5.3)
RBC # BLD: 5.18 M/UL — SIGNIFICANT CHANGE UP (ref 4.2–5.8)
RBC # FLD: 12.6 % — SIGNIFICANT CHANGE UP (ref 10.3–14.5)
SODIUM SERPL-SCNC: 136 MMOL/L — SIGNIFICANT CHANGE UP (ref 135–145)
VIT B12 SERPL-MCNC: 1060 PG/ML — SIGNIFICANT CHANGE UP (ref 232–1245)
VIT D25+D1,25 OH+D1,25 PNL SERPL-MCNC: 47.7 PG/ML — SIGNIFICANT CHANGE UP (ref 19.9–79.3)
WBC # BLD: 16.45 K/UL — HIGH (ref 3.8–10.5)
WBC # FLD AUTO: 16.45 K/UL — HIGH (ref 3.8–10.5)

## 2020-07-13 PROCEDURE — 74018 RADEX ABDOMEN 1 VIEW: CPT | Mod: 26

## 2020-07-13 PROCEDURE — 99222 1ST HOSP IP/OBS MODERATE 55: CPT | Mod: 25

## 2020-07-13 PROCEDURE — 51702 INSERT TEMP BLADDER CATH: CPT

## 2020-07-13 RX ORDER — SENNA PLUS 8.6 MG/1
2 TABLET ORAL AT BEDTIME
Refills: 0 | Status: DISCONTINUED | OUTPATIENT
Start: 2020-07-13 | End: 2020-07-20

## 2020-07-13 RX ORDER — POLYETHYLENE GLYCOL 3350 17 G/17G
17 POWDER, FOR SOLUTION ORAL
Refills: 0 | Status: DISCONTINUED | OUTPATIENT
Start: 2020-07-13 | End: 2020-07-20

## 2020-07-13 RX ADMIN — Medication 30 MILLILITER(S): at 00:09

## 2020-07-13 RX ADMIN — LOSARTAN POTASSIUM 25 MILLIGRAM(S): 100 TABLET, FILM COATED ORAL at 00:09

## 2020-07-13 RX ADMIN — AMLODIPINE BESYLATE 10 MILLIGRAM(S): 2.5 TABLET ORAL at 06:23

## 2020-07-13 RX ADMIN — SENNA PLUS 2 TABLET(S): 8.6 TABLET ORAL at 21:23

## 2020-07-13 RX ADMIN — POLYETHYLENE GLYCOL 3350 17 GRAM(S): 17 POWDER, FOR SOLUTION ORAL at 17:21

## 2020-07-13 NOTE — PROGRESS NOTE ADULT - PROBLEM SELECTOR PLAN 3
-Patient presented with Weakness.  -can be secondary to infection.  -follow urine and blood cx.  -PT eval  -check TSH, HbA1c, vitamin B 12, Folate, vitamin D levels. -Patient presented with Weakness.  -can be secondary to infection.  -follow urine and blood cx.  -PT eval  -TSH, HbA1c, vitamin B 12, Folate, vitamin D levels all within normal limits -Patient presented with Weakness.  -can be secondary to infection.  -follow urine and blood cx.  -PT eval: Recommend JIMMY  -TSH, HbA1c, vitamin B 12, Folate, vitamin D levels all within normal limits

## 2020-07-13 NOTE — PROGRESS NOTE ADULT - SUBJECTIVE AND OBJECTIVE BOX
74 year old male with no significant medical history, lives at home, walks with cane presented to ED after he was feeling weak for 3 days. Patient says he does not take any medications and does not follow with any PCP.  Patient denies medical problems in past, does not go for routine screening test. Patient denies abdominal pain, fever, nausea,, pain, cough, palpitations , nausea , vomiting, bowel or urinary problems. Patient denies any numbness, weakness, dizziness , head ache or any other problems. (11 Jul 2020 12:47)  Pt on Rocephin for UTI. Called for scrotal swelling and hematuria.  Pt denies voiding difficulty. no noctureia or hematuria in past. no recent trauma. Pt unsure how long scrotal swelling.  No reported FHx Ca K,P,B    ICU Vital Signs Last 24 Hrs  T(C): 36.6 (13 Jul 2020 13:37), Max: 37.4 (12 Jul 2020 20:16)  T(F): 97.8 (13 Jul 2020 13:37), Max: 99.4 (12 Jul 2020 20:16)  HR: 72 (13 Jul 2020 13:37) (53 - 72)  BP: 169/72 (13 Jul 2020 13:37) (145/55 - 204/86)  BP(mean): --  ABP: --  ABP(mean): --  RR: 18 (13 Jul 2020 13:37) (18 - 18)  SpO2: 94% (13 Jul 2020 13:37) (94% - 98%)  Alert nad  Abd: soft nt , suprapubic distention evident, NT, Large Left inguinal swelling likely herina, chronically incarcerated.  Left scrotal swelling, soft, nt.  no erythema or fluctuance.  no penile discharge or open lesions.  pt defered prostate exam.                          15.6   16.45 )-----------( 245      ( 13 Jul 2020 06:16 )             44.5     07-13    136  |  103  |  22<H>  ----------------------------<  117<H>  3.7   |  23  |  0.93    Ca    8.6      13 Jul 2020 06:16  Phos  3.9     07-12  Mg     2.4     07-12    TPro  6.9  /  Alb  3.4<L>  /  TBili  0.7  /  DBili  x   /  AST  18  /  ALT  19  /  AlkPhos  61  07-12 74 year old male with no significant medical history, lives at home, walks with cane presented to ED after he was feeling weak for 3 days. Patient says he does not take any medications and does not follow with any PCP.  Patient denies medical problems in past, does not go for routine screening test. Patient denies abdominal pain, fever, nausea,, pain, cough, palpitations , nausea , vomiting, bowel or urinary problems. Patient denies any numbness, weakness, dizziness , head ache or any other problems. (11 Jul 2020 12:47)  Pt on Rocephin for UTI. Called for scrotal swelling and hematuria.  Pt denies voiding difficulty. no noctureia or hematuria in past. no recent trauma. Pt unsure how long scrotal swelling.  ros otherwise neg  denies smoking   No reported FHx Ca K,P,B    ICU Vital Signs Last 24 Hrs  T(C): 36.6 (13 Jul 2020 13:37), Max: 37.4 (12 Jul 2020 20:16)  T(F): 97.8 (13 Jul 2020 13:37), Max: 99.4 (12 Jul 2020 20:16)  HR: 72 (13 Jul 2020 13:37) (53 - 72)  BP: 169/72 (13 Jul 2020 13:37) (145/55 - 204/86)  BP(mean): --  ABP: --  ABP(mean): --  RR: 18 (13 Jul 2020 13:37) (18 - 18)  SpO2: 94% (13 Jul 2020 13:37) (94% - 98%)  Alert nad  Abd: soft nt , suprapubic distention evident, NT, Large Left inguinal swelling likely herina, chronically incarcerated.  Left scrotal swelling, soft, nt.  no erythema or fluctuance.  no penile discharge or open lesions.  pt defered prostate exam.                          15.6   16.45 )-----------( 245      ( 13 Jul 2020 06:16 )             44.5     07-13    136  |  103  |  22<H>  ----------------------------<  117<H>  3.7   |  23  |  0.93    Ca    8.6      13 Jul 2020 06:16  Phos  3.9     07-12  Mg     2.4     07-12    TPro  6.9  /  Alb  3.4<L>  /  TBili  0.7  /  DBili  x   /  AST  18  /  ALT  19  /  AlkPhos  61  07-12

## 2020-07-13 NOTE — PROGRESS NOTE ADULT - ASSESSMENT
scrotal swelling, r/o LIH, r/u Right hydrocele, scrotal edema may related to fluid retention, chf?  urinary retention: staley placed with over 1100cc dark tea colored urine returning with blood tinge. No clots returned.  staley left in place,    Abx for UTI, per cultures  scrotal sono orered  renal/bladder sonogram  monitor urine output  irrigate staley q shift with 60 - 120cc sterile saline until urine clears.  observation

## 2020-07-13 NOTE — PROGRESS NOTE ADULT - SUBJECTIVE AND OBJECTIVE BOX
PGY-1 Progress Note discussed with attending    PAGER #: [[phone]] TILL 5:00 PM  PLEASE CONTACT ON CALL TEAM:  - On Call Team (Please refer to [facility]) FROM 5:00 PM - 8:30PM  - Nightfloat Team FROM 8:30 -7:30 AM    CHIEF COMPLAINT & BRIEF HOSPITAL COURSE:  Patient presented with cc of weakness. U/A was positive for WBC, blood and moderate leukocytes esterase.  Urine cultures are still pending.    Patient seen and examined at bedside, complaining of lower abdominal pain. Currently being treated for UTI.  INTERVAL HPI/OVERNIGHT EVENTS:   Patient was noted to have hematuria and scrotal swelling.     REVIEW OF SYSTEMS:  CONSTITUTIONAL: No fever, weight loss, or fatigue  RESPIRATORY: No cough, wheezing, chills or hemoptysis; No shortness of breath  CARDIOVASCULAR: No chest pain, palpitations, dizziness, or leg swelling  GASTROINTESTINAL: Lower abdominal pain. No nausea, vomiting, or hematemesis; Constipation. No melena or hematochezia.  GENITOURINARY: No dysuria or hematuria, urinary frequency  NEUROLOGICAL: No headaches, memory loss, loss of strength, numbness, or tremors  SKIN: No itching, burning, rashes, or lesions     Vital Signs Last 24 Hrs  T(C): 36.6 (13 Jul 2020 13:37), Max: 37.4 (12 Jul 2020 20:16)  T(F): 97.8 (13 Jul 2020 13:37), Max: 99.4 (12 Jul 2020 20:16)  HR: 72 (13 Jul 2020 13:37) (53 - 72)  BP: 169/72 (13 Jul 2020 13:37) (145/55 - 204/86)  BP(mean): --  RR: 18 (13 Jul 2020 13:37) (18 - 18)  SpO2: 94% (13 Jul 2020 13:37) (94% - 98%)    PHYSICAL EXAMINATION:  GENERAL: NAD, well built  HEAD:  Atraumatic, Normocephalic  EYES:  conjunctiva and sclera clear  NECK: Supple, No JVD, Normal thyroid  CHEST/LUNG: Clear to auscultation. Clear to percussion bilaterally; No rales, rhonchi, wheezing, or rubs  HEART: Regular rate and rhythm; No murmurs, rubs, or gallops  ABDOMEN: Soft, Nontender, Nondistended; Bowel sounds present  NERVOUS SYSTEM:  Alert & Oriented X3,    EXTREMITIES:  2+ Peripheral Pulses, No clubbing, cyanosis, or edema  SKIN: warm dry                          15.6   16.45 )-----------( 245      ( 13 Jul 2020 06:16 )             44.5     07-13    136  |  103  |  22<H>  ----------------------------<  117<H>  3.7   |  23  |  0.93    Ca    8.6      13 Jul 2020 06:16  Phos  3.9     07-12  Mg     2.4     07-12    TPro  6.9  /  Alb  3.4<L>  /  TBili  0.7  /  DBili  x   /  AST  18  /  ALT  19  /  AlkPhos  61  07-12    LIVER FUNCTIONS - ( 12 Jul 2020 06:03 )  Alb: 3.4 g/dL / Pro: 6.9 g/dL / ALK PHOS: 61 U/L / ALT: 19 U/L DA / AST: 18 U/L / GGT: x                   CAPILLARY BLOOD GLUCOSE      RADIOLOGY & ADDITIONAL TESTS: PGY-1 Progress Note discussed with attending    PAGER #: [[phone]] TILL 5:00 PM  PLEASE CONTACT ON CALL TEAM:  - On Call Team (Please refer to [facility]) FROM 5:00 PM - 8:30PM  - Nightfloat Team FROM 8:30 -7:30 AM    CHIEF COMPLAINT & BRIEF HOSPITAL COURSE:  Patient presented with cc of weakness. U/A was positive for WBC, blood and moderate leukocytes esterase.  Urine cultures are still pending.    Patient seen and examined at bedside, complaining of lower abdominal pain. Currently being treated for UTI.  WBC are trending up. Patient currently on Ceftriaxone.    INTERVAL HPI/OVERNIGHT EVENTS:   Patient was noted to have hematuria and scrotal swelling.     REVIEW OF SYSTEMS:  CONSTITUTIONAL: No fever, weight loss, or fatigue  RESPIRATORY: No cough, wheezing, chills or hemoptysis; No shortness of breath  CARDIOVASCULAR: No chest pain, palpitations, dizziness, or leg swelling  GASTROINTESTINAL: Lower abdominal pain. No nausea, vomiting, or hematemesis; Constipation. No melena or hematochezia.  GENITOURINARY: + hematuria, - urinary frequency  NEUROLOGICAL: No headaches, memory loss, loss of strength, numbness, or tremors  SKIN: No itching, burning, rashes, or lesions     Vital Signs Last 24 Hrs  T(C): 36.6 (13 Jul 2020 13:37), Max: 37.4 (12 Jul 2020 20:16)  T(F): 97.8 (13 Jul 2020 13:37), Max: 99.4 (12 Jul 2020 20:16)  HR: 72 (13 Jul 2020 13:37) (53 - 72)  BP: 169/72 (13 Jul 2020 13:37) (145/55 - 204/86)  BP(mean): --  RR: 18 (13 Jul 2020 13:37) (18 - 18)  SpO2: 94% (13 Jul 2020 13:37) (94% - 98%)    PHYSICAL EXAMINATION:  GENERAL: NAD, laying in bed, complaining on pain when trying to move  HEAD:  Atraumatic, Normocephalic  EYES:  conjunctiva and sclera clear  NECK: Supple, No JVD, Normal thyroid  CHEST/LUNG: Clear to auscultation. Clear to percussion bilaterally; No rales, rhonchi, wheezing, or rubs  HEART: Regular rate and rhythm; No murmurs, rubs, or gallops  ABDOMEN: Tender to palpation on lower abdomen, Soft, Nondistended; Bowel sounds present  NERVOUS SYSTEM:  Alert & Oriented X3,    EXTREMITIES:  2+ Peripheral Pulses, No clubbing, cyanosis, or edema  SKIN: warm dry  Genito/urinary: patient refused scrotal examination.                          15.6   16.45 )-----------( 245      ( 13 Jul 2020 06:16 )             44.5     07-13    136  |  103  |  22<H>  ----------------------------<  117<H>  3.7   |  23  |  0.93    Ca    8.6      13 Jul 2020 06:16  Phos  3.9     07-12  Mg     2.4     07-12    TPro  6.9  /  Alb  3.4<L>  /  TBili  0.7  /  DBili  x   /  AST  18  /  ALT  19  /  AlkPhos  61  07-12    LIVER FUNCTIONS - ( 12 Jul 2020 06:03 )  Alb: 3.4 g/dL / Pro: 6.9 g/dL / ALK PHOS: 61 U/L / ALT: 19 U/L DA / AST: 18 U/L / GGT: x                   CAPILLARY BLOOD GLUCOSE      RADIOLOGY & ADDITIONAL TESTS:  < from: Xray Abdomen 1 View PORTABLE -Urgent (07.13.20 @ 13:32) >    INTERPRETATION:  Abdominal pain.    AP supine portable abdomen. Prior 7/12/2020.    Large volume retained colonic stool similar to prior. Nonspecific nonobstructive bowel gas pattern similar to prior. No opaque calculi. Vascular calcification.    Impression: As above    < end of copied text >

## 2020-07-14 DIAGNOSIS — R33.9 RETENTION OF URINE, UNSPECIFIED: ICD-10-CM

## 2020-07-14 LAB
-  AMPICILLIN: SIGNIFICANT CHANGE UP
-  CIPROFLOXACIN: SIGNIFICANT CHANGE UP
-  LEVOFLOXACIN: SIGNIFICANT CHANGE UP
-  NITROFURANTOIN: SIGNIFICANT CHANGE UP
-  TETRACYCLINE: SIGNIFICANT CHANGE UP
-  VANCOMYCIN: SIGNIFICANT CHANGE UP
ANION GAP SERPL CALC-SCNC: 7 MMOL/L — SIGNIFICANT CHANGE UP (ref 5–17)
BUN SERPL-MCNC: 16 MG/DL — SIGNIFICANT CHANGE UP (ref 7–18)
CALCIUM SERPL-MCNC: 8.5 MG/DL — SIGNIFICANT CHANGE UP (ref 8.4–10.5)
CHLORIDE SERPL-SCNC: 100 MMOL/L — SIGNIFICANT CHANGE UP (ref 96–108)
CO2 SERPL-SCNC: 30 MMOL/L — SIGNIFICANT CHANGE UP (ref 22–31)
CREAT SERPL-MCNC: 0.87 MG/DL — SIGNIFICANT CHANGE UP (ref 0.5–1.3)
CULTURE RESULTS: SIGNIFICANT CHANGE UP
GLUCOSE SERPL-MCNC: 92 MG/DL — SIGNIFICANT CHANGE UP (ref 70–99)
HCT VFR BLD CALC: 44.2 % — SIGNIFICANT CHANGE UP (ref 39–50)
HGB BLD-MCNC: 14.8 G/DL — SIGNIFICANT CHANGE UP (ref 13–17)
MCHC RBC-ENTMCNC: 29.7 PG — SIGNIFICANT CHANGE UP (ref 27–34)
MCHC RBC-ENTMCNC: 33.5 GM/DL — SIGNIFICANT CHANGE UP (ref 32–36)
MCV RBC AUTO: 88.8 FL — SIGNIFICANT CHANGE UP (ref 80–100)
METHOD TYPE: SIGNIFICANT CHANGE UP
NRBC # BLD: 0 /100 WBCS — SIGNIFICANT CHANGE UP (ref 0–0)
ORGANISM # SPEC MICROSCOPIC CNT: SIGNIFICANT CHANGE UP
ORGANISM # SPEC MICROSCOPIC CNT: SIGNIFICANT CHANGE UP
PLATELET # BLD AUTO: 242 K/UL — SIGNIFICANT CHANGE UP (ref 150–400)
POTASSIUM SERPL-MCNC: 3.4 MMOL/L — LOW (ref 3.5–5.3)
POTASSIUM SERPL-SCNC: 3.4 MMOL/L — LOW (ref 3.5–5.3)
RBC # BLD: 4.98 M/UL — SIGNIFICANT CHANGE UP (ref 4.2–5.8)
RBC # FLD: 12.9 % — SIGNIFICANT CHANGE UP (ref 10.3–14.5)
SARS-COV-2 RNA SPEC QL NAA+PROBE: SIGNIFICANT CHANGE UP
SODIUM SERPL-SCNC: 137 MMOL/L — SIGNIFICANT CHANGE UP (ref 135–145)
SPECIMEN SOURCE: SIGNIFICANT CHANGE UP
WBC # BLD: 12.65 K/UL — HIGH (ref 3.8–10.5)
WBC # FLD AUTO: 12.65 K/UL — HIGH (ref 3.8–10.5)

## 2020-07-14 PROCEDURE — 76775 US EXAM ABDO BACK WALL LIM: CPT | Mod: 26

## 2020-07-14 PROCEDURE — 76870 US EXAM SCROTUM: CPT | Mod: 26

## 2020-07-14 RX ORDER — POTASSIUM CHLORIDE 20 MEQ
40 PACKET (EA) ORAL EVERY 4 HOURS
Refills: 0 | Status: COMPLETED | OUTPATIENT
Start: 2020-07-14 | End: 2020-07-14

## 2020-07-14 RX ORDER — TAMSULOSIN HYDROCHLORIDE 0.4 MG/1
0.4 CAPSULE ORAL AT BEDTIME
Refills: 0 | Status: DISCONTINUED | OUTPATIENT
Start: 2020-07-14 | End: 2020-07-20

## 2020-07-14 RX ADMIN — TAMSULOSIN HYDROCHLORIDE 0.4 MILLIGRAM(S): 0.4 CAPSULE ORAL at 21:39

## 2020-07-14 RX ADMIN — AMLODIPINE BESYLATE 10 MILLIGRAM(S): 2.5 TABLET ORAL at 05:07

## 2020-07-14 RX ADMIN — POLYETHYLENE GLYCOL 3350 17 GRAM(S): 17 POWDER, FOR SOLUTION ORAL at 05:07

## 2020-07-14 RX ADMIN — POLYETHYLENE GLYCOL 3350 17 GRAM(S): 17 POWDER, FOR SOLUTION ORAL at 18:18

## 2020-07-14 RX ADMIN — Medication 40 MILLIEQUIVALENT(S): at 18:17

## 2020-07-14 RX ADMIN — SENNA PLUS 2 TABLET(S): 8.6 TABLET ORAL at 21:39

## 2020-07-14 RX ADMIN — Medication 40 MILLIEQUIVALENT(S): at 21:39

## 2020-07-14 NOTE — DIETITIAN INITIAL EVALUATION ADULT. - PERTINENT LABORATORY DATA
07-14 Na137 mmol/L Glu 92 mg/dL K+ 3.4 mmol/L<L> Cr  0.87 mg/dL BUN 16 mg/dL   07-12 Phos 3.9 mg/dL   07-12 Alb 3.4 g/dL<L>       07-12 Chol 145 mg/dL LDL 81 mg/dL HDL 53 mg/dL Trig 54 mg/dL  07-13-20 @ 00:53 HgbA1C 5.6 [4.0 - 5.6]

## 2020-07-14 NOTE — DIETITIAN INITIAL EVALUATION ADULT. - NS FNS WEIGHT USED FOR CALC
ideal/Ht=5' 5"   CXN=178 lb    Admission oj=110 lb ?   Current wd=408.3 lb 7/13/2020   Revised BMI=25

## 2020-07-14 NOTE — PROGRESS NOTE ADULT - ASSESSMENT
scrotal swelling improved  hematuria resolved, yellow urine draining    await renal/bladder/scrotal sono  observation  keep staley for now scrotal swelling improved  hematuria resolved, yellow urine draining    await renal/bladder/scrotal sono  observation  keep staley for now  start tamsulosin if no contraindications

## 2020-07-14 NOTE — PROGRESS NOTE ADULT - SUBJECTIVE AND OBJECTIVE BOX
Pt s- new complaints. No c/o abd pain  ICU Vital Signs Last 24 Hrs  T(C): 36.8 (14 Jul 2020 05:10), Max: 37.4 (13 Jul 2020 21:00)  T(F): 98.3 (14 Jul 2020 05:10), Max: 99.4 (13 Jul 2020 21:00)  HR: 97 (14 Jul 2020 05:10) (58 - 100)  BP: 159/95 (14 Jul 2020 05:10) (159/95 - 172/83)  BP(mean): --  ABP: --  ABP(mean): --  RR: 18 (14 Jul 2020 05:10) (18 - 18)  SpO2: 95% (14 Jul 2020 05:10) (94% - 98%)  Alert nad  Abd: soft nt nd  Scrotum; decreased swelling, NT  staley with clear yellow urine draining                        14.8   12.65 )-----------( 242      ( 14 Jul 2020 06:07 )             44.2     07-14    137  |  100  |  16  ----------------------------<  92  3.4<L>   |  30  |  0.87    Ca    8.5      14 Jul 2020 06:07

## 2020-07-14 NOTE — DIETITIAN INITIAL EVALUATION ADULT. - DIET TYPE
May consider Ensure Enlive  1can ( 240ml ) x bid ( 700 kcal, 40 g protein) if decreased intake as medically feasible

## 2020-07-14 NOTE — PROGRESS NOTE ADULT - PROBLEM SELECTOR PLAN 3
- Staley placed yesterday (7/13/2020): drained 1100cc dark tea colored urine with blood tinge, no clots  - Today staley draining yellow clear urine  - Started patient on flomax  - Renal U/s (7/14): Bilateral hydronephrosis

## 2020-07-14 NOTE — CHART NOTE - NSCHARTNOTEFT_GEN_A_CORE
Informed by RN, patients Springer with bright blood. Patient assessed at bedside, no apparent distress, hemodynamically stable, VS: /57 , Temp 97.2 O2 sat 96%, Springer with bright blood, approximately 300 cc since 1400 today. Chart reviewed, patient with large right hydrocele and large bowel containing left inguinal-scrotal hernia on US testicles, bilateral hydronephrosis as described left worse than right on Renal US, previous episode of hematuria being followed by urologist, Hg and Hct normal.   Urologist was paged but no answer. Will notify night float team to follow up.     Case discussed with senior resident. Informed by RN, patients Springer with bright blood. Patient assessed at bedside, no apparent distress, hemodynamically stable, VS: /57 , Temp 97.2 O2 sat 96%, Springer with bright blood, approximately 300 cc since 1400 today. Chart reviewed, patient with large right hydrocele and large bowel containing left inguinal-scrotal hernia on US testicles, bilateral hydronephrosis as described left worse than right on Renal US, previous episode of hematuria being followed by urologist, Hg and Hct normal.   Senior resident spoke to Urologist who will follow up with patient. Will notify night float team to follow up.     Case discussed with senior resident.

## 2020-07-14 NOTE — DIETITIAN INITIAL EVALUATION ADULT. - PERTINENT MEDS FT
MEDICATIONS  (STANDING):  amLODIPine   Tablet 10 milliGRAM(s) Oral daily  cefTRIAXone   IVPB 1000 milliGRAM(s) IV Intermittent every 24 hours  enoxaparin Injectable 40 milliGRAM(s) SubCutaneous daily  polyethylene glycol 3350 17 Gram(s) Oral two times a day  senna 2 Tablet(s) Oral at bedtime  tamsulosin 0.4 milliGRAM(s) Oral at bedtime

## 2020-07-14 NOTE — DIETITIAN INITIAL EVALUATION ADULT. - OTHER INFO
Pt alert, verbally responsive, able to communicate well, but confused/forgetful at times reported, lives home PTA; appetite good, unclear recent wt changes, denied GI distress, chewing or swallowing problem at present, no specific food choices reported, tolerating 75% meals at times per flow sheet; Admission BMI=18.3 ( Ht=5' 5"  qs=707 lb ?? ),  Current ds=197.3 lb 7/13/2020  Revised BMI=25 Pt from home, alert, verbally responsive, able to communicate well, but confused/forgetful at times reported, poor historian; pt reported appetite good, unclear recent wt changes, denied GI distress, chewing or swallowing problem at present, no specific food choices reported, tolerating 75% meals at times per flow sheet; Admission BMI=18.3 ( Ht=5' 5"  qz=451 lb ?? ),  Current wf=435.3 lb 7/13/2020  Revised BMI=25;  consult ordered by MD: pt lives with brother, as per EMS it was unsafe environment; also per ED paper: brother reported patient has not been eating much and too weak to get out of bed most days PTA; Discussed with RN;

## 2020-07-14 NOTE — PROGRESS NOTE ADULT - SUBJECTIVE AND OBJECTIVE BOX
PGY-1 Progress Note discussed with attending    PAGER #: [[phone]] TILL 5:00 PM  PLEASE CONTACT ON CALL TEAM:  - On Call Team (Please refer to Suma) FROM 5:00 PM - 8:30PM  - Nightfloat Team FROM 8:30 -7:30 AM    CHIEF COMPLAINT & BRIEF HOSPITAL COURSE:  74 year old male with no significant medical history, lives at home, walks with cane presented to ED after he was feeling weak for 3 days. Patient says he does not take any medications and does not follow with any PCP. Patient is admitted for treatment for UTI.      INTERVAL HPI/OVERNIGHT EVENTS:   Patient was noted to have swelling of scrotum and hematuria. Urology saw and palced a staley on him yesterday. It returned over 1100cc dark tea colored urine returning with blood tinge. No clots returned.   Today staley is draining clear yellow urine.   Urine Culture was positive for Gram + organisims. Pt. currently on Ceftriaxone.     Patient examined at bedside, has no new complains. Denies any abdominal pain, Chest pain, SOB, N/V/D, Fevers, chills, night sweats.   Scrotal U/S:  Large right hydrocele. Large bowel containing left inguinal-scrotal hernia  Renal U/s:  Bilateral hydronephrosis as described left worse than right      REVIEW OF SYSTEMS:  CONSTITUTIONAL: No fever, weight loss, or fatigue  RESPIRATORY: No cough, wheezing, chills or hemoptysis; No shortness of breath  CARDIOVASCULAR: No chest pain, palpitations, dizziness, or leg swelling  GASTROINTESTINAL: No abdominal pain. No nausea, vomiting, or hematemesis; No diarrhea or constipation. No melena or hematochezia.  GENITOURINARY: No dysuria or hematuria, urinary frequency  NEUROLOGICAL: No headaches, memory loss, loss of strength, numbness, or tremors  SKIN: No itching, burning, rashes, or lesions     MEDICATIONS  (STANDING):  amLODIPine   Tablet 10 milliGRAM(s) Oral daily  cefTRIAXone   IVPB 1000 milliGRAM(s) IV Intermittent every 24 hours  enoxaparin Injectable 40 milliGRAM(s) SubCutaneous daily  polyethylene glycol 3350 17 Gram(s) Oral two times a day  potassium chloride   Powder 40 milliEquivalent(s) Oral every 4 hours  senna 2 Tablet(s) Oral at bedtime  tamsulosin 0.4 milliGRAM(s) Oral at bedtime    MEDICATIONS  (PRN):  acetaminophen   Tablet .. 650 milliGRAM(s) Oral every 6 hours PRN Mild Pain (1 - 3)  aluminum hydroxide/magnesium hydroxide/simethicone Suspension 30 milliLiter(s) Oral every 6 hours PRN Dyspepsia  bisacodyl 5 milliGRAM(s) Oral every 12 hours PRN Constipation  oxycodone    5 mG/acetaminophen 325 mG 1 Tablet(s) Oral every 4 hours PRN Moderate Pain (4 - 6)  oxycodone    5 mG/acetaminophen 325 mG 2 Tablet(s) Oral every 6 hours PRN Severe Pain (7 - 10)      Vital Signs Last 24 Hrs  T(C): 36.8 (14 Jul 2020 14:47), Max: 37.4 (13 Jul 2020 21:00)  T(F): 98.2 (14 Jul 2020 14:47), Max: 99.4 (13 Jul 2020 21:00)  HR: 83 (14 Jul 2020 14:47) (83 - 100)  BP: 148/87 (14 Jul 2020 14:47) (148/87 - 172/83)  BP(mean): --  RR: 18 (14 Jul 2020 14:47) (18 - 18)  SpO2: 96% (14 Jul 2020 14:47) (95% - 96%)    PHYSICAL EXAMINATION:  GENERAL: NAD, well built  HEAD:  Atraumatic, Normocephalic  EYES:  conjunctiva and sclera clear  NECK: Supple, No JVD, Normal thyroid  CHEST/LUNG: Clear to auscultation. Clear to percussion bilaterally; No rales, rhonchi, wheezing, or rubs  HEART: Regular rate and rhythm; No murmurs, rubs, or gallops  ABDOMEN: Soft, Nontender, Nondistended; Bowel sounds present  NERVOUS SYSTEM:  Alert & Oriented X3,    EXTREMITIES:  2+ Peripheral Pulses, No clubbing, cyanosis, or edema  SKIN: warm dry  GENITO/URINARY: Staley in place, draining clear yellow urine.                           14.8   12.65 )-----------( 242      ( 14 Jul 2020 06:07 )             44.2     07-14    137  |  100  |  16  ----------------------------<  92  3.4<L>   |  30  |  0.87    Ca    8.5      14 Jul 2020 06:07      I&O's Summary    13 Jul 2020 07:01  -  14 Jul 2020 07:00  --------------------------------------------------------  IN: 780 mL / OUT: 3075 mL / NET: -2295 mL    14 Jul 2020 07:01  -  14 Jul 2020 14:50  --------------------------------------------------------  IN: 0 mL / OUT: 800 mL / NET: -800 mL      Culture - Urine (collected 11 Jul 2020 19:17)  Source: .Urine Clean Catch (Midstream)  Preliminary Report (13 Jul 2020 15:01):    >100,000 CFU/ml Gram positive organisms    Culture - Blood (collected 11 Jul 2020 15:19)  Source: .Blood Blood  Preliminary Report (12 Jul 2020 16:01):    No growth to date.    Culture - Blood (collected 11 Jul 2020 15:18)  Source: .Blood Blood  Preliminary Report (12 Jul 2020 16:01):    No growth to date.      CAPILLARY BLOOD GLUCOSE    RADIOLOGY & ADDITIONAL TESTS:  < from: Xray Abdomen 1 View PORTABLE -Urgent (07.13.20 @ 13:32) >  Large volume retained colonic stool similar to prior. Nonspecific nonobstructive bowel gas pattern similar to prior. No opaque calculi. Vascular calcification.    < end of copied text >      < from: US Testicles (07.14.20 @ 10:15) >  IMPRESSION:     Normal bilateral testes.    Large right hydrocele.    Large bowel containing left inguinal-scrotal hernia.    < end of copied text >    < from: US Renal (07.14.20 @ 10:12) >  FINDINGS: Each kidney measures 10.2 cm long dimension. There is mild right and moderate left hydronephrosis. No shadowing renal calculus solid or cystic mass bilaterally. No perirenal collections.  The bladder is decompressed with a Staley cannot be adequately evaluated.    Impression: Bilateral hydronephrosis as described left worse than right.    < end of copied text >

## 2020-07-15 DIAGNOSIS — R41.0 DISORIENTATION, UNSPECIFIED: ICD-10-CM

## 2020-07-15 DIAGNOSIS — K40.90 UNILATERAL INGUINAL HERNIA, WITHOUT OBSTRUCTION OR GANGRENE, NOT SPECIFIED AS RECURRENT: ICD-10-CM

## 2020-07-15 DIAGNOSIS — Z04.6 ENCOUNTER FOR GENERAL PSYCHIATRIC EXAMINATION, REQUESTED BY AUTHORITY: ICD-10-CM

## 2020-07-15 DIAGNOSIS — G93.40 ENCEPHALOPATHY, UNSPECIFIED: ICD-10-CM

## 2020-07-15 DIAGNOSIS — F54 PSYCHOLOGICAL AND BEHAVIORAL FACTORS ASSOCIATED WITH DISORDERS OR DISEASES CLASSIFIED ELSEWHERE: ICD-10-CM

## 2020-07-15 LAB
ANION GAP SERPL CALC-SCNC: 8 MMOL/L — SIGNIFICANT CHANGE UP (ref 5–17)
BASOPHILS # BLD AUTO: 0.03 K/UL — SIGNIFICANT CHANGE UP (ref 0–0.2)
BASOPHILS NFR BLD AUTO: 0.3 % — SIGNIFICANT CHANGE UP (ref 0–2)
BUN SERPL-MCNC: 19 MG/DL — HIGH (ref 7–18)
CALCIUM SERPL-MCNC: 8.2 MG/DL — LOW (ref 8.4–10.5)
CHLORIDE SERPL-SCNC: 100 MMOL/L — SIGNIFICANT CHANGE UP (ref 96–108)
CO2 SERPL-SCNC: 27 MMOL/L — SIGNIFICANT CHANGE UP (ref 22–31)
CREAT SERPL-MCNC: 0.73 MG/DL — SIGNIFICANT CHANGE UP (ref 0.5–1.3)
EOSINOPHIL # BLD AUTO: 0.47 K/UL — SIGNIFICANT CHANGE UP (ref 0–0.5)
EOSINOPHIL NFR BLD AUTO: 4.6 % — SIGNIFICANT CHANGE UP (ref 0–6)
GLUCOSE SERPL-MCNC: 93 MG/DL — SIGNIFICANT CHANGE UP (ref 70–99)
HCT VFR BLD CALC: 40.7 % — SIGNIFICANT CHANGE UP (ref 39–50)
HGB BLD-MCNC: 14 G/DL — SIGNIFICANT CHANGE UP (ref 13–17)
IMM GRANULOCYTES NFR BLD AUTO: 0.3 % — SIGNIFICANT CHANGE UP (ref 0–1.5)
LYMPHOCYTES # BLD AUTO: 1.37 K/UL — SIGNIFICANT CHANGE UP (ref 1–3.3)
LYMPHOCYTES # BLD AUTO: 13.4 % — SIGNIFICANT CHANGE UP (ref 13–44)
MAGNESIUM SERPL-MCNC: 1.9 MG/DL — SIGNIFICANT CHANGE UP (ref 1.6–2.6)
MCHC RBC-ENTMCNC: 29.9 PG — SIGNIFICANT CHANGE UP (ref 27–34)
MCHC RBC-ENTMCNC: 34.4 GM/DL — SIGNIFICANT CHANGE UP (ref 32–36)
MCV RBC AUTO: 86.8 FL — SIGNIFICANT CHANGE UP (ref 80–100)
MONOCYTES # BLD AUTO: 1.02 K/UL — HIGH (ref 0–0.9)
MONOCYTES NFR BLD AUTO: 10 % — SIGNIFICANT CHANGE UP (ref 2–14)
NEUTROPHILS # BLD AUTO: 7.32 K/UL — SIGNIFICANT CHANGE UP (ref 1.8–7.4)
NEUTROPHILS NFR BLD AUTO: 71.4 % — SIGNIFICANT CHANGE UP (ref 43–77)
NRBC # BLD: 0 /100 WBCS — SIGNIFICANT CHANGE UP (ref 0–0)
PHOSPHATE SERPL-MCNC: 2.5 MG/DL — SIGNIFICANT CHANGE UP (ref 2.5–4.5)
PLATELET # BLD AUTO: 237 K/UL — SIGNIFICANT CHANGE UP (ref 150–400)
POTASSIUM SERPL-MCNC: 3.2 MMOL/L — LOW (ref 3.5–5.3)
POTASSIUM SERPL-SCNC: 3.2 MMOL/L — LOW (ref 3.5–5.3)
RBC # BLD: 4.69 M/UL — SIGNIFICANT CHANGE UP (ref 4.2–5.8)
RBC # FLD: 12.7 % — SIGNIFICANT CHANGE UP (ref 10.3–14.5)
SODIUM SERPL-SCNC: 135 MMOL/L — SIGNIFICANT CHANGE UP (ref 135–145)
WBC # BLD: 10.24 K/UL — SIGNIFICANT CHANGE UP (ref 3.8–10.5)
WBC # FLD AUTO: 10.24 K/UL — SIGNIFICANT CHANGE UP (ref 3.8–10.5)

## 2020-07-15 PROCEDURE — 99223 1ST HOSP IP/OBS HIGH 75: CPT

## 2020-07-15 RX ORDER — OLANZAPINE 15 MG/1
2.5 TABLET, FILM COATED ORAL DAILY
Refills: 0 | Status: DISCONTINUED | OUTPATIENT
Start: 2020-07-15 | End: 2020-07-20

## 2020-07-15 RX ORDER — POTASSIUM CHLORIDE 20 MEQ
40 PACKET (EA) ORAL EVERY 4 HOURS
Refills: 0 | Status: COMPLETED | OUTPATIENT
Start: 2020-07-15 | End: 2020-07-15

## 2020-07-15 RX ORDER — HALOPERIDOL DECANOATE 100 MG/ML
1 INJECTION INTRAMUSCULAR ONCE
Refills: 0 | Status: COMPLETED | OUTPATIENT
Start: 2020-07-15 | End: 2020-07-15

## 2020-07-15 RX ORDER — CIPROFLOXACIN LACTATE 400MG/40ML
400 VIAL (ML) INTRAVENOUS EVERY 12 HOURS
Refills: 0 | Status: DISCONTINUED | OUTPATIENT
Start: 2020-07-15 | End: 2020-07-20

## 2020-07-15 RX ORDER — HALOPERIDOL DECANOATE 100 MG/ML
5 INJECTION INTRAMUSCULAR ONCE
Refills: 0 | Status: COMPLETED | OUTPATIENT
Start: 2020-07-15 | End: 2020-07-15

## 2020-07-15 RX ORDER — HALOPERIDOL DECANOATE 100 MG/ML
1 INJECTION INTRAMUSCULAR EVERY 6 HOURS
Refills: 0 | Status: DISCONTINUED | OUTPATIENT
Start: 2020-07-15 | End: 2020-07-20

## 2020-07-15 RX ORDER — OLANZAPINE 15 MG/1
5 TABLET, FILM COATED ORAL AT BEDTIME
Refills: 0 | Status: DISCONTINUED | OUTPATIENT
Start: 2020-07-15 | End: 2020-07-20

## 2020-07-15 RX ORDER — MAGNESIUM HYDROXIDE 400 MG/1
30 TABLET, CHEWABLE ORAL ONCE
Refills: 0 | Status: COMPLETED | OUTPATIENT
Start: 2020-07-15 | End: 2020-07-15

## 2020-07-15 RX ADMIN — POLYETHYLENE GLYCOL 3350 17 GRAM(S): 17 POWDER, FOR SOLUTION ORAL at 06:00

## 2020-07-15 RX ADMIN — Medication 40 MILLIEQUIVALENT(S): at 21:52

## 2020-07-15 RX ADMIN — MAGNESIUM HYDROXIDE 30 MILLILITER(S): 400 TABLET, CHEWABLE ORAL at 21:51

## 2020-07-15 RX ADMIN — HALOPERIDOL DECANOATE 5 MILLIGRAM(S): 100 INJECTION INTRAMUSCULAR at 12:55

## 2020-07-15 RX ADMIN — Medication 200 MILLIGRAM(S): at 17:21

## 2020-07-15 RX ADMIN — AMLODIPINE BESYLATE 10 MILLIGRAM(S): 2.5 TABLET ORAL at 06:00

## 2020-07-15 RX ADMIN — POLYETHYLENE GLYCOL 3350 17 GRAM(S): 17 POWDER, FOR SOLUTION ORAL at 17:23

## 2020-07-15 RX ADMIN — TAMSULOSIN HYDROCHLORIDE 0.4 MILLIGRAM(S): 0.4 CAPSULE ORAL at 21:52

## 2020-07-15 RX ADMIN — OLANZAPINE 2.5 MILLIGRAM(S): 15 TABLET, FILM COATED ORAL at 17:22

## 2020-07-15 RX ADMIN — SENNA PLUS 2 TABLET(S): 8.6 TABLET ORAL at 21:52

## 2020-07-15 RX ADMIN — Medication 40 MILLIEQUIVALENT(S): at 17:22

## 2020-07-15 RX ADMIN — OLANZAPINE 5 MILLIGRAM(S): 15 TABLET, FILM COATED ORAL at 21:52

## 2020-07-15 NOTE — BEHAVIORAL HEALTH ASSESSMENT NOTE - DIFFERENTIAL
Acute encephalopathy  Personality factors affecting illness  Encounter for general psychiatric examination, requested by authority

## 2020-07-15 NOTE — BEHAVIORAL HEALTH ASSESSMENT NOTE - SUMMARY
74M, retired and living with brother in shared apt in [place], with no reported PHx and unknown MHx, self-presented to ED on 7/11 c/o feeling weak for 3d and admitted for management of UTI. Psych consult was requested today after pt became acutely agitated and attempted to elope, requiring security assistance and emergency medication (Haldol 5 mg IM) @12:55 pm. On exam later in the day, pt presents somewhat irascible, but generally alert, oriented, linear and organized and not manifesting any acute s/s of psychosis, sherita or suicidality. Pt DOES appear to have capacity to make his own medical and disposition decisions, but he appears open to education and persuasion to remain in the hospital and complete the treatment recommended by primary team. Pt reports having a good relationship with his brother, whose help may be enlisted to help obtain pt's cooperation with care (we strongly recommend maximum efforts to obtain pt's voluntary cooperation before any attempt is made to treat pt over his objection). We believe pt's earlier reported episode of acute agitation was likely attributable to delirium which has now resolved, and agree with primary team plan to administer Zyprexa PO to address any future episodes of agitation. Pt does not appear to present an acute risk of harm to self or others at the time of assessment, and does not appear to be in need of admission to IP psych at the time of assessment.

## 2020-07-15 NOTE — BEHAVIORAL HEALTH ASSESSMENT NOTE - NSBHCHARTREVIEWIMAGING_PSY_A_CORE FT
< from: Xray Abdomen 1 View PORTABLE -Urgent (07.13.20 @ 13:32) >    NTERPRETATION:  Abdominal pain.    AP supine portable abdomen. Prior 7/12/2020.    Large volume retained colonic stool similar to prior. Nonspecific nonobstructive bowel gas pattern similar to prior. No opaque calculi. Vascular calcification.      < end of copied text >    < from: US Renal (07.14.20 @ 10:12) >      Impression: Bilateral hydronephrosis as described left worse than right.      < end of copied text >    < from: US Testicles (07.14.20 @ 10:15) >    IMPRESSION:     Normal bilateral testes.    Large right hydrocele.    Large bowel containing left inguinal-scrotal hernia.        < end of copied text >

## 2020-07-15 NOTE — PROGRESS NOTE ADULT - PROBLEM SELECTOR PLAN 3
- Springer placed yesterday (7/13/2020): drained 1100cc dark tea colored urine with blood tinge, no clots  - Today Springer draining bright red blood  - Started patient on Flomax  - Renal U/s (7/14): Bilateral hydronephrosis  - Urology consulted: no procedures at this time, follow up outpatient  - urology recommends: CT Urogram to determine source of hematuria

## 2020-07-15 NOTE — PROGRESS NOTE ADULT - ASSESSMENT
74 year old male with no significant medical history, lives at home, walks with cane presented to ED after he was feeling weak for 3 days. Patient says he does not take any medications and does not follow with any PCP. Patient is admitted for treatment for UTI, pt was on Ceftriaxone, UCx came back + for enterococcus faecalis, currently on Ciprofloxacin. Noted to have scrotal swelling and hematuria on admission. Scrotal U/S shows Right hydrocele and Large bowel containing left inguinal-scrotal hernia.

## 2020-07-15 NOTE — PROGRESS NOTE ADULT - SUBJECTIVE AND OBJECTIVE BOX
PGY-1 Progress Note discussed with attending    PAGER #: [[phone]] TILL 5:00 PM  PLEASE CONTACT ON CALL TEAM:  - On Call Team (Please refer to Suma) FROM 5:00 PM - 8:30PM  - Nightfloat Team FROM 8:30 -7:30 AM    CHIEF COMPLAINT & BRIEF HOSPITAL COURSE:  74 year old male with no significant medical history, lives at home, walks with cane presented to ED after he was feeling weak for 3 days. Patient says he does not take any medications and does not follow with any PCP. Patient is admitted for treatment for UTI, previously on Ceftriaxone for 4 days, UCx positive for enterococcus faecalis started on Ciprofloxacin today. Pt has associated hematuria, Right hydrocele and Left Large bowel containing left inguinal scrotal hernia. Urology is following the patient.     INTERVAL HPI/OVERNIGHT EVENTS:   Overnight night team was called because of hematuria noted on staley.     In the am patient was examined at bedside, was agreeable and denied any complains. It was noted he had hematuria, without clots on the staley. He denied any abdominal pain or scrotal pain, N/V/D, chest pain, SOB, headache, dizziness, fevers or chills.     Patient became agitated today around 12:30pm, was trying to leave AMA, orientation and deescalation measures where tried to no success, Code grey was called, patient was given Haldol for sedation. Psychiatry was consulted and they examined the patient afterwards.    REVIEW OF SYSTEMS:  CONSTITUTIONAL: No fever, weight loss, or fatigue  RESPIRATORY: No cough, wheezing, chills or hemoptysis; No shortness of breath  CARDIOVASCULAR: No chest pain, palpitations, dizziness, or leg swelling  GASTROINTESTINAL: No abdominal pain. No nausea, vomiting, or hematemesis; No diarrhea or constipation. No melena or hematochezia.  GENITOURINARY: No dysuria or hematuria, urinary frequency  NEUROLOGICAL: No headaches, memory loss, loss of strength, numbness, or tremors  SKIN: No itching, burning, rashes, or lesions     MEDICATIONS  (STANDING):  amLODIPine   Tablet 10 milliGRAM(s) Oral daily  ciprofloxacin   IVPB 400 milliGRAM(s) IV Intermittent every 12 hours  enoxaparin Injectable 40 milliGRAM(s) SubCutaneous daily  OLANZapine 2.5 milliGRAM(s) Oral daily  OLANZapine Disintegrating Tablet 5 milliGRAM(s) Oral at bedtime  polyethylene glycol 3350 17 Gram(s) Oral two times a day  potassium chloride   Powder 40 milliEquivalent(s) Oral every 4 hours  senna 2 Tablet(s) Oral at bedtime  tamsulosin 0.4 milliGRAM(s) Oral at bedtime    MEDICATIONS  (PRN):  acetaminophen   Tablet .. 650 milliGRAM(s) Oral every 6 hours PRN Mild Pain (1 - 3)  aluminum hydroxide/magnesium hydroxide/simethicone Suspension 30 milliLiter(s) Oral every 6 hours PRN Dyspepsia  bisacodyl 5 milliGRAM(s) Oral every 12 hours PRN Constipation  oxycodone    5 mG/acetaminophen 325 mG 1 Tablet(s) Oral every 4 hours PRN Moderate Pain (4 - 6)  oxycodone    5 mG/acetaminophen 325 mG 2 Tablet(s) Oral every 6 hours PRN Severe Pain (7 - 10)    Vital Signs Last 24 Hrs  T(C): 37.2 (15 Jul 2020 14:33), Max: 37.8 (14 Jul 2020 22:30)  T(F): 98.9 (15 Jul 2020 14:33), Max: 100.1 (14 Jul 2020 22:30)  HR: 110 (15 Jul 2020 14:33) (71 - 110)  BP: 129/88 (15 Jul 2020 14:33) (129/88 - 168/99)  BP(mean): --  RR: 18 (15 Jul 2020 14:33) (17 - 18)  SpO2: 96% (15 Jul 2020 14:33) (96% - 97%)    PHYSICAL EXAMINATION:  GENERAL: NAD, pt in bed eating breakfast   HEAD:  Atraumatic, Normocephalic  EYES:  conjunctiva and sclera clear  NECK: Supple, No JVD, Normal thyroid  CHEST/LUNG: Clear to auscultation. Clear to percussion bilaterally; No rales, rhonchi, wheezing, or rubs  HEART: Regular rate and rhythm; No murmurs, rubs, or gallops  ABDOMEN: Soft, Nontender, Nondistended; Bowel sounds present  NERVOUS SYSTEM:  Alert & Oriented X 2 (name, date),    EXTREMITIES:  2+ Peripheral Pulses, No clubbing, cyanosis, or edema  SKIN: warm dry                          14.0   10.24 )-----------( 237      ( 15 Jul 2020 06:11 )             40.7     07-15    135  |  100  |  19<H>  ----------------------------<  93  3.2<L>   |  27  |  0.73    Ca    8.2<L>      15 Jul 2020 06:11  Phos  2.5     07-15  Mg     1.9     07-15    I&O's Summary    14 Jul 2020 07:01  -  15 Jul 2020 07:00  --------------------------------------------------------  IN: 0 mL / OUT: 1800 mL / NET: -1800 mL    CAPILLARY BLOOD GLUCOSE    Culture - Urine (07.11.20 @ 19:17)    -  Levofloxacin: S <=1    -  Nitrofurantoin: S <=32 Should not be used to treat pyelonephritis.    -  Tetra/Doxy: R >8    -  Vancomycin: S 2    -  Ampicillin: S <=2 Predicts results to ampicillin/sulbactam, amoxacillin-clavulanate and  piperacillin-tazobactam.    -  Ciprofloxacin: S <=1    Specimen Source: .Urine Clean Catch (Midstream)    Culture Results:   >100,000 CFU/ml Enterococcus faecalis    Organism Identification: Enterococcus faecalis    Organism: Enterococcus faecalis    Method Type: ARLINE      RADIOLOGY & ADDITIONAL TESTS:  < from: US Testicles (07.14.20 @ 10:15) >  IMPRESSION:     Normal bilateral testes.    Large right hydrocele.    Large bowel containing left inguinal-scrotal hernia.    < end of copied text >    < from: Xray Abdomen 1 View PORTABLE -Urgent (07.13.20 @ 13:32) >  Large volume retained colonic stool similar to prior. Nonspecific nonobstructive bowel gas pattern similar to prior. No opaque calculi. Vascular calcification.    < end of copied text >

## 2020-07-15 NOTE — BEHAVIORAL HEALTH ASSESSMENT NOTE - RISK ASSESSMENT
Low Acute Suicide Risk Risk factors: Older age, acute medical problems. Protective factors: Absent h/o SI/SA/SIB, stable domicile with supportive brother, attachment to independence, help seeking. Risk level appears low at the time of assessment.

## 2020-07-15 NOTE — BEHAVIORAL HEALTH ASSESSMENT NOTE - NSBHCHARTREVIEWINVESTIGATE_PSY_A_CORE FT
< from: 12 Lead ECG (07.11.20 @ 09:56) >    QTC Calculation(Bezet) 461 ms    P Axis 49 degrees    R Axis -69 degrees    T Axis 11 degrees    Diagnosis Line Sinus bradycardia with 1st degree A-V block with Fusion complexes  Possible Left atrial enlargement  Left axis deviation  Right bundle branch block  Left ventricular hypertrophy  Inferior infarct , age undetermined  Anterior infarct , age undetermined  T wave abnormality, consider lateral ischemia  Abnormal ECG    < end of copied text >

## 2020-07-15 NOTE — PROGRESS NOTE ADULT - ASSESSMENT
scrotal swelling, r/o LIH, r/u Right hydrocele, scrotal edema may related to fluid retention, chf?  urinary retention: staley placed with over 1100cc dark tea colored urine returning with blood tinge. No clots returned. Staley left in place, hematuria resolved for 1 day then restarted. US showed large right hydrocele and large bowel containing left inguinal-scrotal hernia on US testicles, bilateral hydronephrosis as described left worse than right on Renal US.    -recommend CT urogram to assess source of hematuria  -Abx for UTI, per cultures  -monitor urine output  -irrigate staley q shift with 60 - 120cc sterile saline until urine clears  -hold off on CBI at this time  -observation

## 2020-07-15 NOTE — BEHAVIORAL HEALTH ASSESSMENT NOTE - NSBHSOCIALHXDETAILSFT_PSY_A_CORE
B/R in [place]. Formerly worked as  at [employer] but now retired. Shares house in [place] with brother. Never , no children, no romantic relationships.

## 2020-07-15 NOTE — PROGRESS NOTE ADULT - SUBJECTIVE AND OBJECTIVE BOX
Subjective  Urine clear yesterday, now bloody. Patient offers no complaints. Denies abdominal pain. Tolerating diet.    Objective    Vital signs  T(F): , Max: 100.1 (07-14-20 @ 22:30)  HR: 100 (07-15-20 @ 05:41)  BP: 140/84 (07-15-20 @ 05:41)  SpO2: 96% (07-15-20 @ 05:41)  Wt(kg): --    Output     OUT:    Indwelling Catheter - Urethral: 1800 mL  Total OUT: 1800 mL    Total NET: -1800 mL          Gen: NAD  Abd: S, NT, ND  : staley secured in place, draining punch colored urine, no clots appreciated in tubing    Labs      07-15 @ 06:11    WBC 10.24 / Hct 40.7  / SCr 0.73     07-14 @ 06:07    WBC 12.65 / Hct 44.2  / SCr 0.87           Urine Cx: ?  Blood Cx: ?    Imaging

## 2020-07-15 NOTE — BEHAVIORAL HEALTH ASSESSMENT NOTE - NSBHCHARTREVIEWLAB_PSY_A_CORE FT
14.0   10.24 )-----------( 237      ( 15 Jul 2020 06:11 )             40.7   07-15    135  |  100  |  19<H>  ----------------------------<  93  3.2<L>   |  27  |  0.73    Ca    8.2<L>      15 Jul 2020 06:11  Phos  2.5     07-15  Mg     1.9     07-15

## 2020-07-15 NOTE — BEHAVIORAL HEALTH ASSESSMENT NOTE - NSBHCHARTREVIEWVS_PSY_A_CORE FT
Vital Signs Last 24 Hrs  T(C): 37.2 (15 Jul 2020 14:33), Max: 37.8 (14 Jul 2020 22:30)  T(F): 98.9 (15 Jul 2020 14:33), Max: 100.1 (14 Jul 2020 22:30)  HR: 110 (15 Jul 2020 14:33) (71 - 110)  BP: 129/88 (15 Jul 2020 14:33) (129/88 - 168/99)  BP(mean): --  RR: 18 (15 Jul 2020 14:33) (17 - 18)  SpO2: 96% (15 Jul 2020 14:33) (96% - 97%)

## 2020-07-15 NOTE — BEHAVIORAL HEALTH ASSESSMENT NOTE - HPI (INCLUDE ILLNESS QUALITY, SEVERITY, DURATION, TIMING, CONTEXT, MODIFYING FACTORS, ASSOCIATED SIGNS AND SYMPTOMS)
74M, retired and living with brother in shared apt in [place], with no reported PHx and unknown MHx, self-presented to ED on 7/11 c/o feeling weak for 3d and admitted for management of UTI. Psych consult was requested today after pt became acutely agitated and attempted to elope, requiring security assistance and emergency medication (Haldol 5 mg IM) @12:55 pm. Pt was seen approximately 1 hr later for eval, encountered sitting up calmly in bed eating lunch (lasagna and vegetables). Pt initially appears wary of MD, saying, "I think you're here to protect the hospital." Pt describes mood as "very upset" about the way he was treated earlier by staff: "I was held down and sedated forcefully." Pt reports that he was trying to leave the hospital because "I was tired of being here and I wanted to go home." When asked why he came to the hospital in the first place if he does not want tx for his infection, pt responds, "That's an interesting question." Pt says he would rather "go home and mind my own business," although he is willing to continue his tx with PO ABX if primary team recommends it. When MD observes that pt has gross hematuria (contents of Springer bag are bright red) and reports that primary team feels pt should stay in the hospital until it has resolved, pt responds, "I suppose that's reasonable." Pt describes mood as "happy" and denies SI/HI/AVH. 74M, retired and living with brother in shared apt in [place], with no reported PHx and unknown MHx, self-presented to ED on 7/11 c/o feeling weak for 3d and admitted for management of UTI. Psych consult was requested today after pt became acutely agitated and attempted to elope, requiring security assistance and emergency medication (Haldol 5 mg IM) @12:55 pm. Pt was seen approximately 1 hr later for eval, encountered sitting up calmly in bed eating lunch (lasagna and vegetables). Pt initially appears wary of MD, saying, "I think you're here to protect the hospital." Pt describes mood as "very upset" about the way he was treated earlier by staff: "I was held down and sedated forcefully." Pt reports that he was trying to leave the hospital because "I was tired of being here and I wanted to go home." When asked why he came to the hospital in the first place if he does not want tx for his infection, pt responds, "That's an interesting question." Pt says he would rather "go home and mind my own business," although he is willing to continue his tx with PO ABX if primary team recommends it. When asked what he would do if he had a medical emergency or felt acutely ill at home, pt says, "I would do what I did this time--go to the hospital." When MD observes that pt has gross hematuria (contents of Springer bag are bright red) and reports that primary team feels pt should stay in the hospital until it has resolved, pt responds, "I suppose that's reasonable." Pt describes mood as "happy" and denies SI/HI/AVH.

## 2020-07-15 NOTE — CHART NOTE - NSCHARTNOTEFT_GEN_A_CORE
DUE TO PATIENT SAFETY ISSUES, PATIENT WILL NEED TO BE PHYSICALLY AND CHEMICALLY RESTRAINED. PATIENT DOES NOT CURRENTLY HAVE MEDICAL CAPACITY FOR DECISION MAKING AND IS PHYSICALLY OBSTRUCTING MEDICAL CARE. SECURITY HAS BEEN CALLED TO ASSIST NURSING STAFF, TO HOLD PATIENT WHEN NECESSARY, DURING MEDICATION ADMINISTRATION. PSYCHIATRY CONSULT IS IN PROGRESS. ADMINISTRATION HAS BEEN MADE AWARE OF THE CIRCUMSTANCES AND APPROVES.

## 2020-07-15 NOTE — BEHAVIORAL HEALTH ASSESSMENT NOTE - NSBHCONSULTRECOMMENDOTHER_PSY_A_CORE FT
1. Pt DOES have capacity to make his own medical and disposition decisions  2. Agree with primary team recommendation of Zyprexa 2.5 mg qam/5 mg qhs for next few days to prevent recurrence of delirium  3. Medical management as directed by primary team  4. Psychiatry is signing off. Reconsult if additional issues arise as inpatient  5. Case d/w Dr. Graham of primary team    Loan Nolasco MD  Director, Consultation-Liaison Psychiatry Service  [id number]

## 2020-07-16 LAB
ANION GAP SERPL CALC-SCNC: 8 MMOL/L — SIGNIFICANT CHANGE UP (ref 5–17)
BUN SERPL-MCNC: 16 MG/DL — SIGNIFICANT CHANGE UP (ref 7–18)
CALCIUM SERPL-MCNC: 8.4 MG/DL — SIGNIFICANT CHANGE UP (ref 8.4–10.5)
CHLORIDE SERPL-SCNC: 107 MMOL/L — SIGNIFICANT CHANGE UP (ref 96–108)
CO2 SERPL-SCNC: 24 MMOL/L — SIGNIFICANT CHANGE UP (ref 22–31)
CREAT SERPL-MCNC: 0.69 MG/DL — SIGNIFICANT CHANGE UP (ref 0.5–1.3)
CULTURE RESULTS: SIGNIFICANT CHANGE UP
CULTURE RESULTS: SIGNIFICANT CHANGE UP
GLUCOSE BLDC GLUCOMTR-MCNC: 99 MG/DL — SIGNIFICANT CHANGE UP (ref 70–99)
GLUCOSE SERPL-MCNC: 95 MG/DL — SIGNIFICANT CHANGE UP (ref 70–99)
HCT VFR BLD CALC: 41.8 % — SIGNIFICANT CHANGE UP (ref 39–50)
HGB BLD-MCNC: 14.1 G/DL — SIGNIFICANT CHANGE UP (ref 13–17)
MCHC RBC-ENTMCNC: 29.7 PG — SIGNIFICANT CHANGE UP (ref 27–34)
MCHC RBC-ENTMCNC: 33.7 GM/DL — SIGNIFICANT CHANGE UP (ref 32–36)
MCV RBC AUTO: 88.2 FL — SIGNIFICANT CHANGE UP (ref 80–100)
NRBC # BLD: 0 /100 WBCS — SIGNIFICANT CHANGE UP (ref 0–0)
PLATELET # BLD AUTO: 252 K/UL — SIGNIFICANT CHANGE UP (ref 150–400)
POTASSIUM SERPL-MCNC: 3.4 MMOL/L — LOW (ref 3.5–5.3)
POTASSIUM SERPL-SCNC: 3.4 MMOL/L — LOW (ref 3.5–5.3)
RBC # BLD: 4.74 M/UL — SIGNIFICANT CHANGE UP (ref 4.2–5.8)
RBC # FLD: 12.6 % — SIGNIFICANT CHANGE UP (ref 10.3–14.5)
SODIUM SERPL-SCNC: 139 MMOL/L — SIGNIFICANT CHANGE UP (ref 135–145)
SPECIMEN SOURCE: SIGNIFICANT CHANGE UP
SPECIMEN SOURCE: SIGNIFICANT CHANGE UP
WBC # BLD: 11.57 K/UL — HIGH (ref 3.8–10.5)
WBC # FLD AUTO: 11.57 K/UL — HIGH (ref 3.8–10.5)

## 2020-07-16 PROCEDURE — 74018 RADEX ABDOMEN 1 VIEW: CPT | Mod: 26

## 2020-07-16 PROCEDURE — 70450 CT HEAD/BRAIN W/O DYE: CPT | Mod: 26

## 2020-07-16 PROCEDURE — 74178 CT ABD&PLV WO CNTR FLWD CNTR: CPT | Mod: 26

## 2020-07-16 PROCEDURE — 99232 SBSQ HOSP IP/OBS MODERATE 35: CPT | Mod: GC

## 2020-07-16 RX ORDER — POTASSIUM CHLORIDE 20 MEQ
40 PACKET (EA) ORAL EVERY 4 HOURS
Refills: 0 | Status: COMPLETED | OUTPATIENT
Start: 2020-07-16 | End: 2020-07-16

## 2020-07-16 RX ADMIN — Medication 200 MILLIGRAM(S): at 18:44

## 2020-07-16 RX ADMIN — POLYETHYLENE GLYCOL 3350 17 GRAM(S): 17 POWDER, FOR SOLUTION ORAL at 06:29

## 2020-07-16 RX ADMIN — SENNA PLUS 2 TABLET(S): 8.6 TABLET ORAL at 22:51

## 2020-07-16 RX ADMIN — OLANZAPINE 5 MILLIGRAM(S): 15 TABLET, FILM COATED ORAL at 22:51

## 2020-07-16 RX ADMIN — Medication 40 MILLIEQUIVALENT(S): at 22:51

## 2020-07-16 RX ADMIN — Medication 40 MILLIEQUIVALENT(S): at 18:45

## 2020-07-16 RX ADMIN — OLANZAPINE 2.5 MILLIGRAM(S): 15 TABLET, FILM COATED ORAL at 12:26

## 2020-07-16 RX ADMIN — POLYETHYLENE GLYCOL 3350 17 GRAM(S): 17 POWDER, FOR SOLUTION ORAL at 18:44

## 2020-07-16 RX ADMIN — Medication 200 MILLIGRAM(S): at 06:28

## 2020-07-16 RX ADMIN — TAMSULOSIN HYDROCHLORIDE 0.4 MILLIGRAM(S): 0.4 CAPSULE ORAL at 22:51

## 2020-07-16 NOTE — CHART NOTE - NSCHARTNOTEFT_GEN_A_CORE
CT a/p reviewed with attending:     < from: CT Abdomen and Pelvis w/wo IV Cont (07.16.20 @ 16:22) >    IMPRESSION:   Markedly enlarged prostate. Enhancing mass at the bladder base may represent intravesicular extension of the prostate however bladder mass cannot be excluded. Recommend cystoscopy. Correlate clinically for prostate cancer. A graft Staley balloon abuts the right ureteral orifice.    Mild right hydroureteronephrosis. Large right extrarenal pelvis. Small left extrarenal pelvis.    Left inguinal hernia containing large and small bowel loops without obstruction. Sigmoid diverticulosis without diverticulitis. Mild constipation.    9 mm pancreatic body cyst. Recommend comparison with any prior studies to ensure stability. If none are available, recommend MRI to evaluate for IPMN.    Cardiomegaly. Trace pericardial effusion and trace bilateral pleural effusions.    Large right hydrocele.      Recommendations:  - d/c with staley in place  - no urologic intervention as inpatient, pt can follow up with Dr. Sanchez as outpt to schedule cysto  - monitor I/O, irrigate staley if decreased output  - d/w Dr. Sanchez

## 2020-07-16 NOTE — PROGRESS NOTE ADULT - SUBJECTIVE AND OBJECTIVE BOX
PGY-1 Progress Note discussed with attending    PAGER #: [[phone]] TILL 5:00 PM  PLEASE CONTACT ON CALL TEAM:  - On Call Team (Please refer to [place]) FROM 5:00 PM - 8:30PM  - Nightfloat Team FROM 8:30 -7:30 AM    CHIEF COMPLAINT & BRIEF HOSPITAL COURSE:  74 year old male with no significant medical history, lives at home, walks with cane presented to ED after he was feeling weak for 3 days.  + Hematuria and Scrotal Swelling        INTERVAL HPI/OVERNIGHT EVENTS:     7/16 02:00: rapid response near syncope after bowel movement. Vitals stable, able to follow comands, CT head ordered -> IMPRESSION: No acute intracranial hemorrhage or vasogenic edema. Small chronic basal ganglia infarcts and chronic microvascular changes.    Patient seen and examined at bedside, he is now more pleasant, denies any dizzines, feeling lightheaded, N/V/D, chest pain, palpitations, abdominal pain. Springer is draining clear yellow urine.   Patient will go for CT Urogram today to determine source of hematuria.     REVIEW OF SYSTEMS:  CONSTITUTIONAL: No fever, weight loss, or fatigue  RESPIRATORY: No cough, wheezing, chills or hemoptysis; No shortness of breath  CARDIOVASCULAR: No chest pain, palpitations, dizziness, or leg swelling  GASTROINTESTINAL: No abdominal pain. No nausea, vomiting, or hematemesis; No diarrhea or constipation. No melena or hematochezia.  GENITOURINARY: No dysuria or hematuria, urinary frequency  MUSCULOSKELETAL: No pain, no Limited ROM  NEUROLOGICAL: No headaches, memory loss, loss of strength, numbness, or tremors  SKIN: No itching, burning, rashes, or lesions     MEDICATIONS  (STANDING):  amLODIPine   Tablet 10 milliGRAM(s) Oral daily  ciprofloxacin   IVPB 400 milliGRAM(s) IV Intermittent every 12 hours  enoxaparin Injectable 40 milliGRAM(s) SubCutaneous daily  OLANZapine 2.5 milliGRAM(s) Oral daily  OLANZapine Disintegrating Tablet 5 milliGRAM(s) Oral at bedtime  polyethylene glycol 3350 17 Gram(s) Oral two times a day  senna 2 Tablet(s) Oral at bedtime  tamsulosin 0.4 milliGRAM(s) Oral at bedtime    MEDICATIONS  (PRN):  acetaminophen   Tablet .. 650 milliGRAM(s) Oral every 6 hours PRN Mild Pain (1 - 3)  aluminum hydroxide/magnesium hydroxide/simethicone Suspension 30 milliLiter(s) Oral every 6 hours PRN Dyspepsia  bisacodyl 5 milliGRAM(s) Oral every 12 hours PRN Constipation  haloperidol    Injectable 1 milliGRAM(s) IV Push every 6 hours PRN severe agitation  oxycodone    5 mG/acetaminophen 325 mG 1 Tablet(s) Oral every 4 hours PRN Moderate Pain (4 - 6)  oxycodone    5 mG/acetaminophen 325 mG 2 Tablet(s) Oral every 6 hours PRN Severe Pain (7 - 10)      Vital Signs Last 24 Hrs  T(C): 37.1 (16 Jul 2020 05:21), Max: 37.2 (15 Jul 2020 14:33)  T(F): 98.8 (16 Jul 2020 05:21), Max: 98.9 (15 Jul 2020 14:33)  HR: 102 (16 Jul 2020 05:21) (102 - 116)  BP: 104/64 (16 Jul 2020 05:21) (104/64 - 160/88)  BP(mean): --  RR: 18 (16 Jul 2020 05:21) (17 - 18)  SpO2: 97% (16 Jul 2020 05:21) (95% - 97%)    PHYSICAL EXAMINATION:  GENERAL: NAD, well built  HEAD:  Atraumatic, Normocephalic  EYES:  conjunctiva and sclera clear  NECK: Supple, No JVD, Normal thyroid  CHEST/LUNG: Clear to auscultation. Clear to percussion bilaterally; No rales, rhonchi, wheezing, or rubs  HEART: Regular rate and rhythm; No murmurs, rubs, or gallops  ABDOMEN: Soft, Nontender, Nondistended; Bowel sounds present  NERVOUS SYSTEM:  Alert & Oriented X3,    EXTREMITIES:  2+ Peripheral Pulses, No clubbing, cyanosis, or edema  SKIN: warm dry  Genitourinary: Springer in place draining clear yellow urine, scrotal swelling 2/2 large bowel hernia and hydrocele                           14.1   11.57 )-----------( 252      ( 16 Jul 2020 06:38 )             41.8     07-16    139  |  107  |  16  ----------------------------<  95  3.4<L>   |  24  |  0.69    Ca    8.4      16 Jul 2020 06:38  Phos  2.5     07-15  Mg     1.9     07-15        I&O's Summary    15 Jul 2020 07:01  -  16 Jul 2020 07:00  --------------------------------------------------------  IN: 0 mL / OUT: 1000 mL / NET: -1000 mL    CAPILLARY BLOOD GLUCOSE      RADIOLOGY & ADDITIONAL TESTS:

## 2020-07-16 NOTE — PROGRESS NOTE ADULT - PROBLEM SELECTOR PLAN 3
- Springer placed yesterday (7/13/2020): drained 1100cc dark tea colored urine with blood tinge, no clots  - Today Springer draining bright red blood  - Started patient on Flomax  - Renal U/s (7/14): Bilateral hydronephrosis  - Urology consulted: no procedures at this time, follow up outpatient  - urology recommends: CT Urogram to determine source of hematuria  -F/u CT urogram

## 2020-07-16 NOTE — PROGRESS NOTE ADULT - SUBJECTIVE AND OBJECTIVE BOX
Subjective  RRT called overnight for vasovagal episode.  Head CT negative for acute pathology.  Staley draining CYU.    Objective    Vital signs  T(F): , Max: 98.9 (07-15-20 @ 14:33)  HR: 102 (07-16-20 @ 05:21)  BP: 104/64 (07-16-20 @ 05:21)  SpO2: 97% (07-16-20 @ 05:21)  Wt(kg): --    Output     OUT:    Indwelling Catheter - Urethral: 1000 mL  Total OUT: 1000 mL    Total NET: -1000 mL          Gen: resting comfortably in bed  Abd: s, nt, nd  : staley draining CYU    Labs      07-16 @ 06:38    WBC 11.57 / Hct 41.8  / SCr 0.69     07-15 @ 06:11    WBC 10.24 / Hct 40.7  / SCr 0.73           Urine Cx: ?  Blood Cx: ?    Imaging

## 2020-07-16 NOTE — CHART NOTE - NSCHARTNOTEFT_GEN_A_CORE
RRT was activated at 2AM after patient had an episode of near syncope and subsequent decreased responsiveness. As per nursing staff, patient requested to get up and physically go to the bathroom. Patient had a bowel movement in the bathroom without issue, but while washing his hands, was noted to be lethargic and on the verge of collapse. He was assisted by nursing staff back to his bed when RRT was called.    Patient noted to be lethargic in his bed. He is able to state his name clearly, but otherwise mumbles and does not open his eyes to command. He is able to move all four extremities and does stick out his tongue on command. He says he feels sleepy. Patient noted to have defecated on himself in his bed. Episode likely vasovagal given correlation with bowel movement, however will r/o neurologic cause with CT head.    Vital signs:  BP:  164/85  HR: 101  O2: 97%  T: 98.6F  fingerstick glucose: 99    Plan:  -STAT CT head  -neuro checks q6  -fall precautions  -bedrest RRT was activated at 2AM after patient had an episode of near syncope and subsequent decreased responsiveness. As per nursing staff, patient requested to get up and physically go to the bathroom. Patient had a bowel movement in the bathroom without issue, but while washing his hands, was noted to be lethargic and on the verge of collapse. He was assisted by nursing staff back to his bed when RRT was called.    Patient noted to be lethargic in his bed. He is able to state his name clearly, but otherwise mumbles and does not open his eyes to command. He is able to move all four extremities and does stick out his tongue on command. He says he feels sleepy. Patient noted to have defecated on himself in his bed. Episode likely vasovagal given correlation with bowel movement, however will r/o acute neurologic cause with stat CT head. Of note, however, patient is more awake now and speaking more clearly.    Vital signs:  BP:  164/85  HR: 101  O2: 97%  T: 98.6F  fingerstick glucose: 99    Plan:  -STAT CT head  -neuro checks q6  -fall precautions  -bedrest

## 2020-07-16 NOTE — PROGRESS NOTE ADULT - ASSESSMENT
scrotal swelling, r/o LIH, r/u Right hydrocele, scrotal edema may related to fluid retention, chf?  urinary retention: staley placed with over 1100cc dark tea colored urine returning with blood tinge. No clots returned. Staley left in place, hematuria resolved for 1 day then restarted. US showed large right hydrocele and large bowel containing left inguinal-scrotal hernia on US testicles, bilateral hydronephrosis as described left worse than right on Renal US.    Hematuria now resolved    -recommend CT urogram to assess source of hematuria  -Abx for UTI, per cultures  -monitor urine output  -irrigate staley q shift with 60 - 120cc sterile saline until urine clears as needed  -observation

## 2020-07-17 DIAGNOSIS — K86.2 CYST OF PANCREAS: ICD-10-CM

## 2020-07-17 DIAGNOSIS — N40.0 BENIGN PROSTATIC HYPERPLASIA WITHOUT LOWER URINARY TRACT SYMPTOMS: ICD-10-CM

## 2020-07-17 LAB
ANION GAP SERPL CALC-SCNC: 8 MMOL/L — SIGNIFICANT CHANGE UP (ref 5–17)
BUN SERPL-MCNC: 14 MG/DL — SIGNIFICANT CHANGE UP (ref 7–18)
CALCIUM SERPL-MCNC: 8.5 MG/DL — SIGNIFICANT CHANGE UP (ref 8.4–10.5)
CANCER AG19-9 SERPL-ACNC: 20 U/ML — SIGNIFICANT CHANGE UP
CEA SERPL-MCNC: 5 NG/ML — HIGH (ref 0–3.8)
CHLORIDE SERPL-SCNC: 105 MMOL/L — SIGNIFICANT CHANGE UP (ref 96–108)
CO2 SERPL-SCNC: 24 MMOL/L — SIGNIFICANT CHANGE UP (ref 22–31)
CREAT SERPL-MCNC: 0.69 MG/DL — SIGNIFICANT CHANGE UP (ref 0.5–1.3)
GLUCOSE SERPL-MCNC: 92 MG/DL — SIGNIFICANT CHANGE UP (ref 70–99)
HCT VFR BLD CALC: 42.9 % — SIGNIFICANT CHANGE UP (ref 39–50)
HGB BLD-MCNC: 14.3 G/DL — SIGNIFICANT CHANGE UP (ref 13–17)
MAGNESIUM SERPL-MCNC: 1.9 MG/DL — SIGNIFICANT CHANGE UP (ref 1.6–2.6)
MCHC RBC-ENTMCNC: 29.4 PG — SIGNIFICANT CHANGE UP (ref 27–34)
MCHC RBC-ENTMCNC: 33.3 GM/DL — SIGNIFICANT CHANGE UP (ref 32–36)
MCV RBC AUTO: 88.1 FL — SIGNIFICANT CHANGE UP (ref 80–100)
NRBC # BLD: 0 /100 WBCS — SIGNIFICANT CHANGE UP (ref 0–0)
PHOSPHATE SERPL-MCNC: 3 MG/DL — SIGNIFICANT CHANGE UP (ref 2.5–4.5)
PLATELET # BLD AUTO: 248 K/UL — SIGNIFICANT CHANGE UP (ref 150–400)
POTASSIUM SERPL-MCNC: 3.5 MMOL/L — SIGNIFICANT CHANGE UP (ref 3.5–5.3)
POTASSIUM SERPL-SCNC: 3.5 MMOL/L — SIGNIFICANT CHANGE UP (ref 3.5–5.3)
RBC # BLD: 4.87 M/UL — SIGNIFICANT CHANGE UP (ref 4.2–5.8)
RBC # FLD: 12.7 % — SIGNIFICANT CHANGE UP (ref 10.3–14.5)
SARS-COV-2 RNA SPEC QL NAA+PROBE: SIGNIFICANT CHANGE UP
SODIUM SERPL-SCNC: 137 MMOL/L — SIGNIFICANT CHANGE UP (ref 135–145)
WBC # BLD: 7.9 K/UL — SIGNIFICANT CHANGE UP (ref 3.8–10.5)
WBC # FLD AUTO: 7.9 K/UL — SIGNIFICANT CHANGE UP (ref 3.8–10.5)

## 2020-07-17 RX ADMIN — Medication 200 MILLIGRAM(S): at 17:42

## 2020-07-17 RX ADMIN — OLANZAPINE 2.5 MILLIGRAM(S): 15 TABLET, FILM COATED ORAL at 13:01

## 2020-07-17 RX ADMIN — SENNA PLUS 2 TABLET(S): 8.6 TABLET ORAL at 21:29

## 2020-07-17 RX ADMIN — Medication 200 MILLIGRAM(S): at 06:32

## 2020-07-17 RX ADMIN — POLYETHYLENE GLYCOL 3350 17 GRAM(S): 17 POWDER, FOR SOLUTION ORAL at 17:42

## 2020-07-17 RX ADMIN — OLANZAPINE 5 MILLIGRAM(S): 15 TABLET, FILM COATED ORAL at 21:29

## 2020-07-17 RX ADMIN — TAMSULOSIN HYDROCHLORIDE 0.4 MILLIGRAM(S): 0.4 CAPSULE ORAL at 21:29

## 2020-07-17 RX ADMIN — AMLODIPINE BESYLATE 10 MILLIGRAM(S): 2.5 TABLET ORAL at 06:32

## 2020-07-17 RX ADMIN — POLYETHYLENE GLYCOL 3350 17 GRAM(S): 17 POWDER, FOR SOLUTION ORAL at 06:32

## 2020-07-17 NOTE — PROGRESS NOTE ADULT - ASSESSMENT
scrotal swelling, r/o LIH, r/u Right hydrocele, scrotal edema may related to fluid retention, chf?  urinary retention: staley placed with over 1100cc dark tea colored urine returning with blood tinge. No clots returned. Staley left in place, hematuria resolved for 1 day then restarted. US showed large right hydrocele and large bowel containing left inguinal-scrotal hernia on US testicles, bilateral hydronephrosis as described left worse than right on Renal US. CTU showed enlarged prostate and right hydroureteronephrosis in addition to left inguinal hernia containing bowel and right hydrocele.    Hematuria now resolved      Plan  - d/c with staley in place  - no urologic intervention as inpatient, pt can follow up with Dr. Sanchez as outpt to schedule cysto  - monitor I/O, irrigate staley if decreased output  - d/w Dr. Sanchez

## 2020-07-17 NOTE — CONSULT NOTE ADULT - PROBLEM SELECTOR RECOMMENDATION 2
that is causing urinary retention  Springer is in now  check PSA  the mass at bladder base probably is extension of the prostate, but can not rule out bladder mass  he needs a cysto for biopsy.

## 2020-07-17 NOTE — PROGRESS NOTE ADULT - PROBLEM SELECTOR PLAN 3
- Springer placed (7/13/2020): drained 1100cc dark tea colored urine with blood tinge, no clots  - Today Springer draining clear yellow  - patient on Flomax  - Renal U/s (7/14): Bilateral hydronephrosis  - Urology consulted: no procedures at this time, D/c With Springer follow up outpatient for +/- cystoscopy and enlarged prostate w/u  -CT Urogram: Enlarged prostate, enhancing mass on bladder base (prostate vs. mass)

## 2020-07-17 NOTE — CONSULT NOTE ADULT - ASSESSMENT
74 year old male presented with weakness.  He was found to have very large prostate causing urinary retention.  he also has a 9 mm pancreatic cyst.
75 y/o Male w/ left inguinoscrotal hernia containing large bowel, no evidence of obstruction     -Pt to benefit from hernia repair, this may be done as outpatient, no urgent need for surgical intervention   -Medical optimization if pt wishes for inpatient hernia repair   -Bowel regiment/ stool softeners   -C/w reg diet   -Care as per medical team   -D/w Dr May and agrees

## 2020-07-17 NOTE — CONSULT NOTE ADULT - SUBJECTIVE AND OBJECTIVE BOX
Patient is a 74y old  Male who presents with a chief complaint of Weakness (17 Jul 2020 10:58)      HPI:  74 year old male with no significant medical history, lives at home, walks with cane presented to ED after he was feeling weak for 3 days. Patient says he does not take any medications and does not follow with any PCP.  Patient denies medical problems in past, does not go for routine screening test. Patient denies abdominal pain, fever, nausea,, pain, cough, palpitations , nausea , vomiting, bowel or urinary problems. Patient denies any numbness, weakness, dizziness , head ache or any other problems. (11 Jul 2020 12:47)he was found to have large prostate and urinary retention.  CT also showed a 9mm pancreatic cyst.       ROS:  Negative except for:    PAST MEDICAL & SURGICAL HISTORY:      SOCIAL HISTORY:    FAMILY HISTORY:  FH: hypertension      MEDICATIONS  (STANDING):  amLODIPine   Tablet 10 milliGRAM(s) Oral daily  ciprofloxacin   IVPB 400 milliGRAM(s) IV Intermittent every 12 hours  enoxaparin Injectable 40 milliGRAM(s) SubCutaneous daily  OLANZapine 2.5 milliGRAM(s) Oral daily  OLANZapine Disintegrating Tablet 5 milliGRAM(s) Oral at bedtime  polyethylene glycol 3350 17 Gram(s) Oral two times a day  senna 2 Tablet(s) Oral at bedtime  tamsulosin 0.4 milliGRAM(s) Oral at bedtime    MEDICATIONS  (PRN):  acetaminophen   Tablet .. 650 milliGRAM(s) Oral every 6 hours PRN Mild Pain (1 - 3)  aluminum hydroxide/magnesium hydroxide/simethicone Suspension 30 milliLiter(s) Oral every 6 hours PRN Dyspepsia  bisacodyl 5 milliGRAM(s) Oral every 12 hours PRN Constipation  haloperidol    Injectable 1 milliGRAM(s) IV Push every 6 hours PRN severe agitation  oxycodone    5 mG/acetaminophen 325 mG 1 Tablet(s) Oral every 4 hours PRN Moderate Pain (4 - 6)  oxycodone    5 mG/acetaminophen 325 mG 2 Tablet(s) Oral every 6 hours PRN Severe Pain (7 - 10)      Allergies    No Known Allergies    Intolerances        Vital Signs Last 24 Hrs  T(C): 36.9 (17 Jul 2020 14:32), Max: 37.1 (17 Jul 2020 05:00)  T(F): 98.4 (17 Jul 2020 14:32), Max: 98.7 (17 Jul 2020 05:00)  HR: 97 (17 Jul 2020 14:32) (91 - 103)  BP: 116/70 (17 Jul 2020 14:32) (116/70 - 162/92)  BP(mean): --  RR: 18 (17 Jul 2020 14:32) (18 - 18)  SpO2: 96% (17 Jul 2020 14:32) (96% - 97%)    PHYSICAL EXAM  General: adult in NAD  HEENT: clear oropharynx, anicteric sclera, pink conjunctiva  Neck: supple  CV: normal S1/S2 with no murmur rubs or gallops  Lungs: positive air movement b/l ant lungs,clear to auscultation, no wheezes, no rales  Abdomen: soft non-tender non-distended, no hepatosplenomegaly  Ext: no clubbing cyanosis or edema  Skin: no rashes and no petechiae  Neuro: alert and oriented X 4, no focal deficits      LABS:                          14.3   7.90  )-----------( 248      ( 17 Jul 2020 05:34 )             42.9         Mean Cell Volume : 88.1 fl  Mean Cell Hemoglobin : 29.4 pg  Mean Cell Hemoglobin Concentration : 33.3 gm/dL  Auto Neutrophil # : x  Auto Lymphocyte # : x  Auto Monocyte # : x  Auto Eosinophil # : x  Auto Basophil # : x  Auto Neutrophil % : x  Auto Lymphocyte % : x  Auto Monocyte % : x  Auto Eosinophil % : x  Auto Basophil % : x      Serial CBC's  07-17 @ 05:34  Hct-42.9 / Hgb-14.3 / Plat-248 / RBC-4.87 / WBC-7.90  Serial CBC's  07-16 @ 06:38  Hct-41.8 / Hgb-14.1 / Plat-252 / RBC-4.74 / WBC-11.57  Serial CBC's  07-15 @ 06:11  Hct-40.7 / Hgb-14.0 / Plat-237 / RBC-4.69 / WBC-10.24  Serial CBC's  07-14 @ 06:07  Hct-44.2 / Hgb-14.8 / Plat-242 / RBC-4.98 / WBC-12.65      07-17    137  |  105  |  14  ----------------------------<  92  3.5   |  24  |  0.69    Ca    8.5      17 Jul 2020 05:34  Phos  3.0     07-17  Mg     1.9     07-17            Folate, Serum: 10.5 ng/mL (07-13 @ 00:53)  Vitamin B12, Serum: 1060 pg/mL (07-13 @ 00:53)              BLOOD SMEAR INTERPRETATION:       RADIOLOGY & ADDITIONAL STUDIES:  < from: CT Abdomen and Pelvis w/wo IV Cont (07.16.20 @ 16:22) >  PROCEDURE:   CT of the Abdomen and Pelvis was performed with and without intravenous contrast.   Precontrast, Nephrographic and Excretory phases were acquired.  Intravenous contrast: 90 ml Omnipaque 350. 10 ml discarded.  Oral contrast: None.  Sagittal and coronal reformats were performed.    FINDINGS:  LOWER CHEST: Trace bilateral pleural effusions. Mild cardiomegaly. Trace pericardial effusion.    LIVER: Scattered less than 5 mm hypodensities too small to characterize. Right lobe 16.8 cm sagittal.  BILE DUCTS: Normal caliber.  GALLBLADDER: 9 mm pancreatic body cyst (8:41). No pancreatic duct distention.  SPLEEN: Within normal limits.  PANCREAS: Within normal limits.  ADRENALS: Within normal limits.  KIDNEYS/URETERS: Minimal right hydronephrosis. Large right extrarenal pelvis. Mild right ureteral dilatation to the level of the urinary bladder. The Springer balloon about the right ureteral orifice. No left hydroureteronephrosis. Small left extrarenal pelvis. Symmetrical nephrograms. Contrast has not filled the right ureter due to dilated extrarenal pelvis.    BLADDER: Springer catheter in situ. Intraluminal air related to Springer catheter. Enhancing mass at the bladder base inseparable from the prostate. The Springer balloon abuts the right ureteral orifice.  REPRODUCTIVE ORGANS: Markedly enlarged prostate measuring 6.6 cm AP x 6.6 cm transverse x at least 7.5 cm sagittal. The prostate projects into the bladder base and is inseparable from the mass at the bladder base. Coarse calcifications are present within the prostate. Large right hydrocele.    BOWEL: No bowel obstruction. Appendix is not visualized. No evidence of inflammation in the pericecal region. Mild retained fecal matter throughout the colon. Diverticulosis of the sigmoid colon without diverticulitis. A left inguinal hernia contains unobstructed small bowel loops and a small portion of the proximal sigmoid colon.  PERITONEUM: No ascites.  VESSELS: Atherosclerotic changes.  RETROPERITONEUM/LYMPH NODES: No lymphadenopathy.    ABDOMINAL WALL: Left inguinal hernia containing nonobstructed small bowel loops.  BONES: Degenerative changes.    IMPRESSION:   Markedly enlarged prostate. Enhancing mass at the bladder base may represent intravesicular extension of the prostate however bladder mass cannot be excluded. Recommend cystoscopy. Correlate clinically for prostate cancer. A graft Springer balloon abuts the right ureteral orifice.    Mild right hydroureteronephrosis. Large right extrarenal pelvis. Small left extrarenal pelvis.    Left inguinal hernia containing large and small bowel loops without obstruction. Sigmoid diverticulosis without diverticulitis. Mild constipation.    9 mm pancreatic body cyst. Recommend comparison with any prior studies to ensure stability. If none are available, recommend MRI to evaluate for IPMN.    Cardiomegaly. Trace pericardial effusion and trace bilateral pleural effusions.    Large right hydrocele.      < end of copied text >
Attending: Dr May      HPI:  74 year old male with no significant PMHx, PSHx -? rt inguinal region surgery, pt uncertain (sx done when pt was a child); admitted to medical services w/ weakness and UTI; General surgery called re left inguinoscrotal hernia containing large bowel as seen on US; pt seen and examined at bedside, offers no acute complaints at this time, denies abd pain, no nausea or vomiting, tolerating regular diet, +flatus/ bm 3 days ago, not aware of hernia presence, no inguinal region pain w/ prolonged standing or heavy lifting; Denies fever, chills, SOB or CP.           MEDICATIONS  (STANDING):  amLODIPine   Tablet 10 milliGRAM(s) Oral daily  cefTRIAXone   IVPB 1000 milliGRAM(s) IV Intermittent every 24 hours  enoxaparin Injectable 40 milliGRAM(s) SubCutaneous daily  polyethylene glycol 3350 17 Gram(s) Oral two times a day  potassium chloride   Powder 40 milliEquivalent(s) Oral every 4 hours  senna 2 Tablet(s) Oral at bedtime  tamsulosin 0.4 milliGRAM(s) Oral at bedtime    MEDICATIONS  (PRN):  acetaminophen   Tablet .. 650 milliGRAM(s) Oral every 6 hours PRN Mild Pain (1 - 3)  aluminum hydroxide/magnesium hydroxide/simethicone Suspension 30 milliLiter(s) Oral every 6 hours PRN Dyspepsia  bisacodyl 5 milliGRAM(s) Oral every 12 hours PRN Constipation  oxycodone    5 mG/acetaminophen 325 mG 1 Tablet(s) Oral every 4 hours PRN Moderate Pain (4 - 6)  oxycodone    5 mG/acetaminophen 325 mG 2 Tablet(s) Oral every 6 hours PRN Severe Pain (7 - 10)      Allergies    No Known Allergies    Intolerances        SOCIAL HISTORY:  Unknown   FAMILY HISTORY:  FH: hypertension      Vital Signs Last 24 Hrs  T(C): 36.8 (14 Jul 2020 14:47), Max: 37.4 (13 Jul 2020 21:00)  T(F): 98.2 (14 Jul 2020 14:47), Max: 99.4 (13 Jul 2020 21:00)  HR: 83 (14 Jul 2020 14:47) (83 - 100)  BP: 148/87 (14 Jul 2020 14:47) (148/87 - 172/83)  BP(mean): --  RR: 18 (14 Jul 2020 14:47) (18 - 18)  SpO2: 96% (14 Jul 2020 14:47) (95% - 96%)    I&O's Summary    13 Jul 2020 07:01  -  14 Jul 2020 07:00  --------------------------------------------------------  IN: 780 mL / OUT: 3075 mL / NET: -2295 mL    14 Jul 2020 07:01  -  14 Jul 2020 16:27  --------------------------------------------------------  IN: 0 mL / OUT: 800 mL / NET: -800 mL        LABS:                        14.8   12.65 )-----------( 242      ( 14 Jul 2020 06:07 )             44.2     07-14    137  |  100  |  16  ----------------------------<  92  3.4<L>   |  30  |  0.87    Ca    8.5      14 Jul 2020 06:07          CAPILLARY BLOOD GLUCOSE      Cultures:  Culture - Urine (07.11.20 @ 19:17)    Specimen Source: .Urine Clean Catch (Midstream)    Culture Results:   >100,000 CFU/ml Gram positive organisms  Identification and susceptibility to follow.        RADIOLOGY & ADDITIONAL STUDIES:    < from: US Testicles (07.14.20 @ 10:15) >  FINDINGS:    RIGHT:  Right testis: 5.4 x 1.3 x 5.0 cm. Normal echogenicity and echotexture with no masses or areas of architectural distortion. Normal arterial and venous blood flow pattern.  Right epididymis: Within normal limits.  Right hydrocele: Large  Right varicocele: None.    LEFT:   Left testis: 4.4 x 2.8 x 4.4 cm. Normal echogenicity and echotexture with no masses or areas of architectural distortion. Normal arterial and venous blood flow pattern.  Left epididymis: Not well seen  Left hydrocele: Small  Left varicocele: None. Large bowel containing hernia.    IMPRESSION:     Normal bilateral testes.    Large right hydrocele.    Large bowel containing left inguinal-scrotal hernia.    < end of copied text >

## 2020-07-17 NOTE — PROGRESS NOTE ADULT - SUBJECTIVE AND OBJECTIVE BOX
PGY-1 Progress Note discussed with attending    PAGER #: [[phone]] TILL 5:00 PM  PLEASE CONTACT ON CALL TEAM:  - On Call Team (Please refer to [place]) FROM 5:00 PM - 8:30PM  - Nightfloat Team FROM 8:30 -7:30 AM    CHIEF COMPLAINT & BRIEF HOSPITAL COURSE:      INTERVAL HPI/OVERNIGHT EVENTS:       REVIEW OF SYSTEMS:  CONSTITUTIONAL: No fever, weight loss, or fatigue  RESPIRATORY: No cough, wheezing, chills or hemoptysis; No shortness of breath  CARDIOVASCULAR: No chest pain, palpitations, dizziness, or leg swelling  GASTROINTESTINAL: No abdominal pain. No nausea, vomiting, or hematemesis; No diarrhea or constipation. No melena or hematochezia.  GENITOURINARY: Springer in place, No dysuria or hematuria, urinary frequency  MUSCULOSKELETAL: No pain, No Limited ROM  NEUROLOGICAL: Generalized weakness, No headaches, memory loss, loss of strength, numbness, or tremors  SKIN: No itching, burning, rashes, or lesions     MEDICATIONS  (STANDING):  amLODIPine   Tablet 10 milliGRAM(s) Oral daily  ciprofloxacin   IVPB 400 milliGRAM(s) IV Intermittent every 12 hours  enoxaparin Injectable 40 milliGRAM(s) SubCutaneous daily  OLANZapine 2.5 milliGRAM(s) Oral daily  OLANZapine Disintegrating Tablet 5 milliGRAM(s) Oral at bedtime  polyethylene glycol 3350 17 Gram(s) Oral two times a day  senna 2 Tablet(s) Oral at bedtime  tamsulosin 0.4 milliGRAM(s) Oral at bedtime    MEDICATIONS  (PRN):  acetaminophen   Tablet .. 650 milliGRAM(s) Oral every 6 hours PRN Mild Pain (1 - 3)  aluminum hydroxide/magnesium hydroxide/simethicone Suspension 30 milliLiter(s) Oral every 6 hours PRN Dyspepsia  bisacodyl 5 milliGRAM(s) Oral every 12 hours PRN Constipation  haloperidol    Injectable 1 milliGRAM(s) IV Push every 6 hours PRN severe agitation  oxycodone    5 mG/acetaminophen 325 mG 1 Tablet(s) Oral every 4 hours PRN Moderate Pain (4 - 6)  oxycodone    5 mG/acetaminophen 325 mG 2 Tablet(s) Oral every 6 hours PRN Severe Pain (7 - 10)      Vital Signs Last 24 Hrs  T(C): 37.1 (17 Jul 2020 05:00), Max: 37.1 (17 Jul 2020 05:00)  T(F): 98.7 (17 Jul 2020 05:00), Max: 98.7 (17 Jul 2020 05:00)  HR: 103 (17 Jul 2020 05:00) (91 - 103)  BP: 152/82 (17 Jul 2020 05:00) (147/81 - 162/92)  BP(mean): --  RR: 18 (17 Jul 2020 05:00) (18 - 18)  SpO2: 96% (17 Jul 2020 05:00) (96% - 97%)    PHYSICAL EXAMINATION:  GENERAL: NAD, well built  HEAD:  Atraumatic, Normocephalic  EYES:  conjunctiva and sclera clear  NECK: Supple, No JVD, Normal thyroid  CHEST/LUNG: Clear to auscultation. Clear to percussion bilaterally; No rales, rhonchi, wheezing, or rubs  HEART: Regular rate and rhythm; No murmurs, rubs, or gallops  ABDOMEN: Soft, Nontender, Nondistended; Bowel sounds present  NERVOUS SYSTEM:  Alert & Oriented X3,    EXTREMITIES:  2+ Peripheral Pulses, No clubbing, cyanosis, or edema  SKIN: warm dry  GENITOURINARY:  Springer in place, draining clear yellow urine. no bladder distention. Scrotal swelling 2/2 hernia and hydrocele                          14.3   7.90  )-----------( 248      ( 17 Jul 2020 05:34 )             42.9     07-17    137  |  105  |  14  ----------------------------<  92  3.5   |  24  |  0.69    Ca    8.5      17 Jul 2020 05:34  Phos  3.0     07-17  Mg     1.9     07-17      I&O's Summary    16 Jul 2020 07:01  -  17 Jul 2020 07:00  --------------------------------------------------------  IN: 0 mL / OUT: 1750 mL / NET: -1750 mL            CAPILLARY BLOOD GLUCOSE      RADIOLOGY & ADDITIONAL TESTS:    < from: CT Abdomen and Pelvis w/wo IV Cont (07.16.20 @ 16:22) >  IMPRESSION:   Markedly enlarged prostate. Enhancing mass at the bladder base may represent intravesicular extension of the prostate however bladder mass cannot be excluded. Recommend cystoscopy. Correlate clinically for prostate cancer. A graft Springer balloon abuts the right ureteral orifice.    Mild right hydroureteronephrosis. Large right extrarenal pelvis. Small left extrarenal pelvis.    Left inguinal hernia containing large and small bowel loops without obstruction. Sigmoid diverticulosis without diverticulitis. Mild constipation.    9 mm pancreatic body cyst. Recommend comparison with any prior studies to ensure stability. If none are available, recommend MRI to evaluate for IPMN.    Cardiomegaly. Trace pericardial effusion and trace bilateral pleural effusions.    Large right hydrocele.    < end of copied text >

## 2020-07-17 NOTE — CONSULT NOTE ADULT - PROBLEM SELECTOR RECOMMENDATION 9
9 mm in size  ?IPMN  we can do a MRI to see it is connected with main or branch duct.  but it needs to ne monitored.  Can repeat scan in 6 month

## 2020-07-17 NOTE — PROGRESS NOTE ADULT - SUBJECTIVE AND OBJECTIVE BOX
Subjective  Seen and examined, states he is feeling well. Staley in place, draining clear yellow urine. Patient understands he will leave with staley and follow up outpt with Dr. Sanchez for cystoscopy and +/- TOV.      Objective    Vital signs  T(F): , Max: 98.7 (07-17-20 @ 05:00)  HR: 103 (07-17-20 @ 05:00)  BP: 152/82 (07-17-20 @ 05:00)  SpO2: 96% (07-17-20 @ 05:00)  Wt(kg): --    Output     OUT:    Indwelling Catheter - Urethral: 1750 mL  Total OUT: 1750 mL    Total NET: -1750 mL          Gen: NAD  Abd: s, nt, nd  : bilateral scrotal swelling, staley secured in place, draining CYU    Labs      CBC (07-17 @ 05:34)                          14.3                     7.90    )--------------(  248        --    % Neuts, --    % Lymphs, ANC: --                              42.9    CBC (07-16 @ 06:38)                          14.1                     11.57<H>  )--------------(  252        --    % Neuts, --    % Lymphs, ANC: --                              41.8      BMP (07-17 @ 05:34)       137     |  105     |  14    			Ca++ --      Ca 8.5          ---------------------------------( 92    		Mg 1.9          3.5     |  24      |  0.69  			Ph 3.0     BMP (07-16 @ 06:38)       139     |  107     |  16    			Ca++ --      Ca 8.4          ---------------------------------( 95    		Mg --           3.4<L>  |  24      |  0.69  			Ph --                    -> .Urine Clean Catch (Midstream) Culture (07-11 @ 19:17)     NG    Enterococcus faecalis    >100,000 CFU/ml Enterococcus faecalis    -> .Blood Blood Culture (07-11 @ 15:19)     NG    NG    No Growth Final    -> .Blood Blood Culture (07-11 @ 15:18)     NG    NG    No Growth Final

## 2020-07-17 NOTE — PROGRESS NOTE ADULT - NSHPATTENDINGPLANDISCUSS_GEN_ALL_CORE
Dr Sanchez
PATIENT AND FLOOR STAFF
PATIENT, BROTHER AND FLOOR STAFF

## 2020-07-17 NOTE — PROGRESS NOTE ADULT - ATTENDING COMMENTS
ct reviewed - large intravesical prostate vs mass  complete course of abx   favor d/c with staley to leg bag and f/u for outpatient cysto
HPI:  74 year old male with no significant medical history, lives at home, walks with cane presented to ED after he was feeling weak for 3 days. Patient says he does not take any medications and does not follow with any PCP.  Patient denies medical problems in past, does not go for routine screening test. Patient denies abdominal pain, fever, nausea,, pain, cough, palpitations , nausea , vomiting, bowel or urinary problems. Patient denies any numbness, weakness, dizziness , head ache or any other problems. (11 Jul 2020 12:47)    # URINARY TRACT INFECTION W/ URINARY RETENTION - PLACED ON ROCEPHIN, UCX - W/ GRAM POSITIVE ORGANISM, BCX, BILL PLACED, UROLOGY CONSULT IN PROGRESS  # CONSTIPATION - PLACED ON MIRALAX, TAP WATER ENEMA X 3 DAYS  # WEAKNESS SUSPECTED TO BE SECONDARY TO INFECTION - PT/OT RECOMMEND SUBACUTE REHAB  # SCROTAL SWELLING - F/U TESTICULAR ULTRASOUND, UROLOGY CONSULT PLACED  # MODERATE PCM  # PT/OT EVALUATION RECOMMENDED SUBACUTE REHAB  # GI AND DVT PPX    UNIQUE GILL M.D. COVERING FOR NITISH GILL M.D.
HPI:  74 year old male with no significant medical history, lives at home, walks with cane presented to ED after he was feeling weak for 3 days. Patient says he does not take any medications and does not follow with any PCP.  Patient denies medical problems in past, does not go for routine screening test. Patient denies abdominal pain, fever, nausea,, pain, cough, palpitations , nausea , vomiting, bowel or urinary problems. Patient denies any numbness, weakness, dizziness , head ache or any other problems. (11 Jul 2020 12:47)    # URINARY TRACT INFECTION W/ URINARY RETENTION W/ BILATERAL HYDRONEPHROSIS NOW WITH HEMATURIA - S/P RENAL/BLADDER ULTRASOUND, PLACED ON ROCEPHIN, UCX - W/ GRAM POSITIVE ORGANISM, BCX, BILL PLACED, STARTED ON FLOMAX, TREND HGB, UROLOGY CONSULT IN PROGRESS  # CONSTIPATION - PLACED ON MIRALAX, TAP WATER ENEMA X 3 DAYS  # WEAKNESS SUSPECTED TO BE SECONDARY TO INFECTION - PT/OT RECOMMEND SUBACUTE REHAB  # SCROTAL SWELLING W/  LARGE RIGHT HYDROCELE AND LEFT INGUINAL-SCROTAL HERNIA - UROLOGY CONSULT IN PROGRESS, SURGERY CONSULT IN PROGRESS - RECOMMEND SURGICAL REPAIR IN FUTURE  # MODERATE PCM  # PT/OT EVALUATION RECOMMENDED SUBACUTE REHAB  # GI AND DVT PPX    UNIQUE GILL M.D. COVERING FOR NITISH GILL M.D.
HPI:  74 year old male with no significant medical history, lives at home, walks with cane presented to ED after he was feeling weak for 3 days. Patient says he does not take any medications and does not follow with any PCP.  Patient denies medical problems in past, does not go for routine screening test. Patient denies abdominal pain, fever, nausea,, pain, cough, palpitations , nausea , vomiting, bowel or urinary problems. Patient denies any numbness, weakness, dizziness , head ache or any other problems. (11 Jul 2020 12:47)    # ACUTE METABOLIC ENCEPHALOPATHY - SUSPECTED TO BE S/T INFECTION - IMPROVED - S/P IM HALDOL, CT HEAD - WITH CHRONIC ISCHEMIC CHANGES - , INITIATED ON ZYPREXA, PSYCH CONSULT IN PROGRESS  # URINARY TRACT INFECTION W/ URINARY RETENTION W/ BILATERAL HYDRONEPHROSIS NOW WITH HEMATURIA; E.FAECALIS UTI - S/P RENAL/BLADDER ULTRASOUND, SWITCHED FROM ROCEPHIN TO CIPROFLOXACIN, BCX, BILL PLACED, STARTED ON FLOMAX, TREND HGB, INTERMITTENT FLUSHES, UROLOGY CONSULT IN PROGRESS  # BLADDER MASS - BPH +/- BLADDER MASS - UROLOGY  CONSULT IN PROGRESS - PATIENT MAY NEED CYSTOSCOPY. ONCOLOGY CONSULT TO BE PLACED.  # SUSPECTED PANCREATIC MASS - F/U TUMOR MARKERS, ONCOLOGY CONSULT TO BE PLACED  # CONSTIPATION - IMPROVED - S/P ENEMA AND MAGNESIUM CITRATE - PLACED ON MIRALAX  # WEAKNESS SUSPECTED TO BE SECONDARY TO INFECTION - PT/OT RECOMMEND SUBACUTE REHAB  # SCROTAL SWELLING W/  LARGE RIGHT HYDROCELE AND LEFT INGUINAL-SCROTAL HERNIA - UROLOGY CONSULT IN PROGRESS, SURGERY CONSULT IN PROGRESS - RECOMMEND SURGICAL REPAIR IN FUTURE  # MODERATE PCM  # PT/OT EVALUATION RECOMMENDED SUBACUTE REHAB  # CASE DISCUSSED EXTENSIVELY WITH BROTHER LAWRENCE KU, [phone]. PATIENT AND BROTHER AGREED TO SUBACUTE REHAB - SOCIAL WORK / CASE MANAGEMENT IS COORDINATING  # GI AND DVT PPX    UNIQUE GILL M.D. COVERING FOR NITISH GILL M.D.
HPI:  74 year old male with no significant medical history, lives at home, walks with cane presented to ED after he was feeling weak for 3 days. Patient says he does not take any medications and does not follow with any PCP.  Patient denies medical problems in past, does not go for routine screening test. Patient denies abdominal pain, fever, nausea,, pain, cough, palpitations , nausea , vomiting, bowel or urinary problems. Patient denies any numbness, weakness, dizziness , head ache or any other problems. (11 Jul 2020 12:47)    # ACUTE METABOLIC ENCEPHALOPATHY - SUSPECTED TO BE S/T INFECTION - IMPROVED - S/P IM HALDOL, CT HEAD - WITH CHRONIC ISCHEMIC CHANGES - , INITIATED ON ZYPREXA, PSYCH CONSULT IN PROGRESS  # URINARY TRACT INFECTION W/ URINARY RETENTION W/ BILATERAL HYDRONEPHROSIS NOW WITH HEMATURIA; E.FAECALIS UTI - S/P RENAL/BLADDER ULTRASOUND, SWITCHED FROM ROCEPHIN TO CIPROFLOXACIN, BILL PLACED, STARTED ON FLOMAX, TREND HGB, INTERMITTENT FLUSHES, UROLOGY CONSULT IN PROGRESS  # BLADDER MASS - BPH +/- BLADDER MASS - UROLOGY  CONSULT IN PROGRESS - RECOMMEND DC WITH BILL AND OUTPATIENT CYSTOSCOPY. ONCOLOGY CONSULT TO BE PLACED.  # SUSPECTED PANCREATIC MASS - F/U TUMOR MARKERS, ONCOLOGY CONSULT IN PROGRESS  # CONSTIPATION - IMPROVED - S/P ENEMA AND MAGNESIUM CITRATE - PLACED ON MIRALAX  # WEAKNESS SUSPECTED TO BE SECONDARY TO INFECTION - PT/OT RECOMMEND SUBACUTE REHAB  # SCROTAL SWELLING W/  LARGE RIGHT HYDROCELE AND LEFT INGUINAL-SCROTAL HERNIA - UROLOGY CONSULT IN PROGRESS, SURGERY CONSULT IN PROGRESS - RECOMMEND SURGICAL REPAIR IN FUTURE  # MODERATE PCM  # PT/OT EVALUATION RECOMMENDED SUBACUTE REHAB  # CASE DISCUSSED EXTENSIVELY WITH BROTHER LAWRENCE KU, [phone]. PATIENT AND BROTHER AGREED TO SUBACUTE REHAB - SOCIAL WORK / CASE MANAGEMENT IS COORDINATING  # GI AND DVT PPX    UNIQUE GILL M.D. COVERING FOR NITISH GILL M.D.
HPI:  74 year old male with no significant medical history, lives at home, walks with cane presented to ED after he was feeling weak for 3 days. Patient says he does not take any medications and does not follow with any PCP.  Patient denies medical problems in past, does not go for routine screening test. Patient denies abdominal pain, fever, nausea,, pain, cough, palpitations , nausea , vomiting, bowel or urinary problems. Patient denies any numbness, weakness, dizziness , head ache or any other problems. (11 Jul 2020 12:47)    # ACUTE METABOLIC ENCEPHALOPATHY - SUSPECTED TO BE S/T INFECTION - S/P IM HALDOL, PATIENT PLACED UNDER OBSERVATION, INITIATED ON ZYPREXA, PSYCH CONSULT IN PROGRESS  # URINARY TRACT INFECTION W/ URINARY RETENTION W/ BILATERAL HYDRONEPHROSIS NOW WITH HEMATURIA; E.FAECALIS UTI - S/P RENAL/BLADDER ULTRASOUND, SWITCHED FROM ROCEPHIN TO CIPROFLOXACIN, BCX, BILL PLACED, STARTED ON FLOMAX, TREND HGB, INTERMITTENT FLUSHES, UROLOGY CONSULT IN PROGRESS  # CONSTIPATION - PLACED ON MIRALAX, TAP WATER ENEMA X 3 DAYS  # WEAKNESS SUSPECTED TO BE SECONDARY TO INFECTION - PT/OT RECOMMEND SUBACUTE REHAB  # SCROTAL SWELLING W/  LARGE RIGHT HYDROCELE AND LEFT INGUINAL-SCROTAL HERNIA - UROLOGY CONSULT IN PROGRESS, SURGERY CONSULT IN PROGRESS - RECOMMEND SURGICAL REPAIR IN FUTURE  # MODERATE PCM  # PT/OT EVALUATION RECOMMENDED SUBACUTE REHAB  # CASE DISCUSSED EXTENSIVELY WITH BROTHER LAWRENCE KU, [phone]. BROTHER EXPRESSED THAT PATIENT MAY BENEFIT FROM SUBACUTE REHAB - CASE MANAGEMENT IS COORDINATING  # GI AND DVT PPX    UNIQUE GILL M.D. COVERING FOR NITISH GILL M.D.

## 2020-07-18 LAB — PSA FLD-MCNC: 14.4 NG/ML — HIGH (ref 0–4)

## 2020-07-18 PROCEDURE — 74183 MRI ABD W/O CNTR FLWD CNTR: CPT | Mod: 26

## 2020-07-18 RX ADMIN — Medication 200 MILLIGRAM(S): at 17:30

## 2020-07-18 RX ADMIN — Medication 200 MILLIGRAM(S): at 05:58

## 2020-07-18 RX ADMIN — OLANZAPINE 5 MILLIGRAM(S): 15 TABLET, FILM COATED ORAL at 21:46

## 2020-07-18 RX ADMIN — OLANZAPINE 2.5 MILLIGRAM(S): 15 TABLET, FILM COATED ORAL at 12:30

## 2020-07-18 RX ADMIN — TAMSULOSIN HYDROCHLORIDE 0.4 MILLIGRAM(S): 0.4 CAPSULE ORAL at 21:46

## 2020-07-18 RX ADMIN — POLYETHYLENE GLYCOL 3350 17 GRAM(S): 17 POWDER, FOR SOLUTION ORAL at 17:30

## 2020-07-18 RX ADMIN — POLYETHYLENE GLYCOL 3350 17 GRAM(S): 17 POWDER, FOR SOLUTION ORAL at 05:58

## 2020-07-18 RX ADMIN — SENNA PLUS 2 TABLET(S): 8.6 TABLET ORAL at 21:46

## 2020-07-18 RX ADMIN — AMLODIPINE BESYLATE 10 MILLIGRAM(S): 2.5 TABLET ORAL at 05:59

## 2020-07-18 NOTE — PROGRESS NOTE ADULT - SUBJECTIVE AND OBJECTIVE BOX
condition stable  no fever  no pain  eat well  on staley catheter  urine clean  MEDICATIONS  (STANDING):  amLODIPine   Tablet 10 milliGRAM(s) Oral daily  ciprofloxacin   IVPB 400 milliGRAM(s) IV Intermittent every 12 hours  enoxaparin Injectable 40 milliGRAM(s) SubCutaneous daily  OLANZapine 2.5 milliGRAM(s) Oral daily  OLANZapine Disintegrating Tablet 5 milliGRAM(s) Oral at bedtime  polyethylene glycol 3350 17 Gram(s) Oral two times a day  senna 2 Tablet(s) Oral at bedtime  tamsulosin 0.4 milliGRAM(s) Oral at bedtime    MEDICATIONS  (PRN):  acetaminophen   Tablet .. 650 milliGRAM(s) Oral every 6 hours PRN Mild Pain (1 - 3)  aluminum hydroxide/magnesium hydroxide/simethicone Suspension 30 milliLiter(s) Oral every 6 hours PRN Dyspepsia  bisacodyl 5 milliGRAM(s) Oral every 12 hours PRN Constipation  haloperidol    Injectable 1 milliGRAM(s) IV Push every 6 hours PRN severe agitation  oxycodone    5 mG/acetaminophen 325 mG 1 Tablet(s) Oral every 4 hours PRN Moderate Pain (4 - 6)  oxycodone    5 mG/acetaminophen 325 mG 2 Tablet(s) Oral every 6 hours PRN Severe Pain (7 - 10)      Allergies    No Known Allergies    Intolerances        Vital Signs Last 24 Hrs  T(C): 37.6 (18 Jul 2020 13:58), Max: 37.6 (18 Jul 2020 13:58)  T(F): 99.6 (18 Jul 2020 13:58), Max: 99.6 (18 Jul 2020 13:58)  HR: 116 (18 Jul 2020 13:58) (97 - 116)  BP: 135/73 (18 Jul 2020 13:58) (135/73 - 161/84)  BP(mean): --  RR: 18 (18 Jul 2020 13:58) (18 - 18)  SpO2: 98% (18 Jul 2020 13:58) (96% - 98%)    PHYSICAL EXAM  General: adult in NAD  HEENT: clear oropharynx, anicteric sclera, pink conjunctiva  Neck: supple  CV: normal S1/S2 with no murmur rubs or gallops  Lungs: positive air movement b/l ant lungs,clear to auscultation, no wheezes, no rales  Abdomen: soft non-tender non-distended, no hepatosplenomegaly  Ext: no clubbing cyanosis or edema  Skin: no rashes and no petechiae  Neuro: alert and oriented X 4, no focal deficits  LABS:                          14.3   7.90  )-----------( 248      ( 17 Jul 2020 05:34 )             42.9         Mean Cell Volume : 88.1 fl  Mean Cell Hemoglobin : 29.4 pg  Mean Cell Hemoglobin Concentration : 33.3 gm/dL  Auto Neutrophil # : x  Auto Lymphocyte # : x  Auto Monocyte # : x  Auto Eosinophil # : x  Auto Basophil # : x  Auto Neutrophil % : x  Auto Lymphocyte % : x  Auto Monocyte % : x  Auto Eosinophil % : x  Auto Basophil % : x    Serial CBC  Hematocrit 42.9  Hemoglobin 14.3  Plat 248  RBC 4.87  WBC 7.90  Serial CBC  Hematocrit 41.8  Hemoglobin 14.1  Plat 252  RBC 4.74  WBC 11.57  Serial CBC  Hematocrit 40.7  Hemoglobin 14.0  Plat 237  RBC 4.69  WBC 10.24    07-17    137  |  105  |  14  ----------------------------<  92  3.5   |  24  |  0.69    Ca    8.5      17 Jul 2020 05:34  Phos  3.0     07-17  Mg     1.9     07-17            Folate, Serum: 10.5 ng/mL (07-13 @ 00:53)  Vitamin B12, Serum: 1060 pg/mL (07-13 @ 00:53)            BLOOD SMEAR INTERPRETATION:       RADIOLOGY & ADDITIONAL STUDIES:

## 2020-07-18 NOTE — PROGRESS NOTE ADULT - SUBJECTIVE AND OBJECTIVE BOX
Patient is a 74y old  Male who presents with a chief complaint of Weakness (18 Jul 2020 18:54)    PATIENT IS SEEN AND EXAMINED IN MEDICAL FLOOR.      ALLERGIES:  No Known Allergies        VITALS:    Vital Signs Last 24 Hrs  T(C): 37.6 (18 Jul 2020 13:58), Max: 37.6 (18 Jul 2020 13:58)  T(F): 99.6 (18 Jul 2020 13:58), Max: 99.6 (18 Jul 2020 13:58)  HR: 116 (18 Jul 2020 13:58) (97 - 116)  BP: 135/73 (18 Jul 2020 13:58) (135/73 - 161/84)  BP(mean): --  RR: 18 (18 Jul 2020 13:58) (18 - 18)  SpO2: 98% (18 Jul 2020 13:58) (96% - 98%)    LABS:    CBC Full  -  ( 17 Jul 2020 05:34 )  WBC Count : 7.90 K/uL  RBC Count : 4.87 M/uL  Hemoglobin : 14.3 g/dL  Hematocrit : 42.9 %  Platelet Count - Automated : 248 K/uL  Mean Cell Volume : 88.1 fl  Mean Cell Hemoglobin : 29.4 pg  Mean Cell Hemoglobin Concentration : 33.3 gm/dL  Auto Neutrophil # : x  Auto Lymphocyte # : x  Auto Monocyte # : x  Auto Eosinophil # : x  Auto Basophil # : x  Auto Neutrophil % : x  Auto Lymphocyte % : x  Auto Monocyte % : x  Auto Eosinophil % : x  Auto Basophil % : x      07-17    137  |  105  |  14  ----------------------------<  92  3.5   |  24  |  0.69    Ca    8.5      17 Jul 2020 05:34  Phos  3.0     07-17  Mg     1.9     07-17      Creatinine Trend: 0.69<--, 0.69<--, 0.73<--, 0.87<--, 0.93<--, 0.82<--  I&O's Summary    17 Jul 2020 07:01  -  18 Jul 2020 07:00  --------------------------------------------------------  IN: 0 mL / OUT: 1150 mL / NET: -1150 mL    18 Jul 2020 07:01  -  18 Jul 2020 19:25  --------------------------------------------------------  IN: 836 mL / OUT: 500 mL / NET: 336 mL            .Urine Clean Catch (Midstream)  07-11 @ 19:17   >100,000 CFU/ml Enterococcus faecalis  --  Enterococcus faecalis      .Blood Blood  07-11 @ 15:19   No Growth Final  --  --      .Blood Blood  07-11 @ 15:18   No Growth Final  --  --          MEDICATIONS:    MEDICATIONS  (STANDING):  amLODIPine   Tablet 10 milliGRAM(s) Oral daily  ciprofloxacin   IVPB 400 milliGRAM(s) IV Intermittent every 12 hours  enoxaparin Injectable 40 milliGRAM(s) SubCutaneous daily  OLANZapine 2.5 milliGRAM(s) Oral daily  OLANZapine Disintegrating Tablet 5 milliGRAM(s) Oral at bedtime  polyethylene glycol 3350 17 Gram(s) Oral two times a day  senna 2 Tablet(s) Oral at bedtime  tamsulosin 0.4 milliGRAM(s) Oral at bedtime      MEDICATIONS  (PRN):  acetaminophen   Tablet .. 650 milliGRAM(s) Oral every 6 hours PRN Mild Pain (1 - 3)  aluminum hydroxide/magnesium hydroxide/simethicone Suspension 30 milliLiter(s) Oral every 6 hours PRN Dyspepsia  bisacodyl 5 milliGRAM(s) Oral every 12 hours PRN Constipation  haloperidol    Injectable 1 milliGRAM(s) IV Push every 6 hours PRN severe agitation  oxycodone    5 mG/acetaminophen 325 mG 1 Tablet(s) Oral every 4 hours PRN Moderate Pain (4 - 6)  oxycodone    5 mG/acetaminophen 325 mG 2 Tablet(s) Oral every 6 hours PRN Severe Pain (7 - 10)        REVIEW OF SYSTEMS:                           ALL ROS DONE [ X   ]      CONSTITUTIONAL:  LETHARGIC [   ], FEVER [   ], UNRESPONSIVE [   ]  CVS:  CP  [   ], SOB, [   ], PALPITATIONS [   ], DIZZYNESS [   ]  RS: COUGH [   ], SPUTUM [   ]  GI: ABDOMINAL PAIN [   ], NAUSEA [   ], VOMITINGS [   ], DIARRHEA [   ], CONSTIPATION [   ]  :  DYSURIA [   ], NOCTURIA [   ], INCREASED FREQUENCY [   ], DRIBLING [   ],  SKELETAL: PAINFUL JOINTS [   ], SWOLLEN JOINTS [   ], NECK ACHE [   ], LOW BACK ACHE [   ],  SKIN : ULCERS [   ], RASH [   ], ITCHING [   ]  CNS: HEAD ACHE [   ], DOUBLE VISION [   ], BLURRED VISION [   ], AMS / CONFUSION [   ], SEIZURES [   ], WEAKNESS [   ],TINGLING / NUMBNESS [   ]      PHYSICAL EXAMINATION:    GENERAL APPEARANCE: NO DISTRESS  HEENT:  NO PALLOR, NO  JVD,  NO   NODES, NECK SUPPLE  CVS: S1 +, S2 +,   RS: AEEB,  OCCASIONAL  RALES +,   NO RONCHI  ABD: SOFT, NT, NO, BS +  EXT: NO PE  SKIN: WARM,   SKELETAL:  ROM ACCEPTABLE  CNS:  AAO X 2-3   , NO  DEFICITS        RADIOLOGY :    < from: MR Abdomen w/wo IV Cont (07.18.20 @ 13:23) >  IMPRESSION:    1.4 cm and 3.0 cm cysts in the pancreatic body and uncinate process, respectively, without concerning features or high-risk stigmata. Initial MRI surveillance every 6 months for a total of 2 years is recommended. If stable, the follow-up interval can be increased.    < end of copied text >    < from: US Testicles (07.14.20 @ 10:15) >  IMPRESSION:     Normal bilateral testes.    Large right hydrocele.    Large bowel containing left inguinal-scrotal hernia.    < end of copied text >      < from: US Renal (07.14.20 @ 10:12) >  Impression: Bilateral hydronephrosis as described left worse than right.    < end of copied text >    < from: CT Abdomen and Pelvis w/wo IV Cont (07.16.20 @ 16:22) >    IMPRESSION:   Markedly enlarged prostate. Enhancing mass at the bladder base may represent intravesicular extension of the prostate however bladder mass cannot be excluded. Recommend cystoscopy. Correlate clinically for prostate cancer. A graft Bill balloon abuts the right ureteral orifice.    Mild right hydroureteronephrosis. Large right extrarenal pelvis. Small left extrarenal pelvis.    Left inguinal hernia containing large and small bowel loops without obstruction. Sigmoid diverticulosis without diverticulitis. Mild constipation.    9 mm pancreatic body cyst. Recommend comparison with any prior studies to ensure stability. If none are available, recommend MRI to evaluate for IPMN.    Cardiomegaly. Trace pericardial effusion and trace bilateral pleural effusions.    Large right hydrocele.    < end of copied text >      ASSESSMENT :     Weakness      PLAN:  HPI:  74 year old male with no significant medical history, lives at home, walks with cane presented to ED after he was feeling weak for 3 days. Patient says he does not take any medications and does not follow with any PCP.  Patient denies medical problems in past, does not go for routine screening test. Patient denies abdominal pain, fever, nausea,, pain, cough, palpitations , nausea , vomiting, bowel or urinary problems. Patient denies any numbness, weakness, dizziness , head ache or any other problems. (11 Jul 2020 12:47)    # ACUTE METABOLIC ENCEPHALOPATHY DUE TO INFECTION - RESOLVING - CAN DC ALL ANTIPSYCHOTICS NOW AND OBSERVE  # URINARY TRACT INFECTION, URINARY RETENTION, BPH, SUSPECT CANCER PROSTATE, BLADDER TUMOR , BILATERAL HYDRONEPHROSIS, UTI WITH E. FAECALIS, - S/P RENAL  ULTRASOUND, ON CIPROFLOXACIN, ON BILL,  FLOMAX - UROLOGY CONSULT IN PROGRESS, OUT PATIENT CYSTOSCOPY WHEN STABLE FOR FURTHER  EVALUATION AND MANAGEMENT   # ONCOLOGY CONSULT TO F/UP  # PANCREATIC CYSTS   # CONSTIPATION  ON MIRALAX, SENNA  # RIGHT SIDED HYDROCELE, AND LEFT INGUINAL HERNIA - UROLOGY CONSULT IN PROGRESS, SURGERY CONSULT IN PROGRESS - REPAIR AS AN OUT PATIENT WHEN STABLE  # MODERATE PCM  # PT/OT EVALUATION RECOMMENDED SUBACUTE REHAB  # CASE DISCUSSED EXTENSIVELY WITH BROTHER LAWRENCE KU, [phone]. PATIENT AND BROTHER AGREED TO SUBACUTE REHAB - SOCIAL WORK / CASE MANAGEMENT IS COORDINATING  # GI AND DVT PPX    -  DR. SOFIYA GILL

## 2020-07-18 NOTE — PROGRESS NOTE ADULT - ASSESSMENT
· Assessment		  74 year old male presented with weakness.  He was found to have very large prostate causing urinary retention.  he also has a 9 mm pancreatic cyst.    Problem/Recommendation - 1:  Problem: Pancreatic cyst. Recommendation: 9 mm in size  ?IPMN  we can do a MRI to see it is connected with main or branch duct.  but it needs to ne monitored.  Can repeat scan in 6 month.    Problem/Recommendation - 2:  ·  Problem: Enlarged prostate.  Recommendation: that is causing urinary retention  Springer is in now  check PSA  the mass at bladder base probably is extension of the prostate, but can not rule out bladder mass  he needs a cysto for biopsy.

## 2020-07-19 LAB
ANION GAP SERPL CALC-SCNC: 6 MMOL/L — SIGNIFICANT CHANGE UP (ref 5–17)
BUN SERPL-MCNC: 15 MG/DL — SIGNIFICANT CHANGE UP (ref 7–18)
CALCIUM SERPL-MCNC: 8.5 MG/DL — SIGNIFICANT CHANGE UP (ref 8.4–10.5)
CANCER AG19-9 SERPL-ACNC: 19 U/ML — SIGNIFICANT CHANGE UP
CHLORIDE SERPL-SCNC: 103 MMOL/L — SIGNIFICANT CHANGE UP (ref 96–108)
CO2 SERPL-SCNC: 27 MMOL/L — SIGNIFICANT CHANGE UP (ref 22–31)
CREAT SERPL-MCNC: 0.73 MG/DL — SIGNIFICANT CHANGE UP (ref 0.5–1.3)
GLUCOSE SERPL-MCNC: 115 MG/DL — HIGH (ref 70–99)
HCT VFR BLD CALC: 41.7 % — SIGNIFICANT CHANGE UP (ref 39–50)
HGB BLD-MCNC: 13.9 G/DL — SIGNIFICANT CHANGE UP (ref 13–17)
MAGNESIUM SERPL-MCNC: 2 MG/DL — SIGNIFICANT CHANGE UP (ref 1.6–2.6)
MCHC RBC-ENTMCNC: 29.1 PG — SIGNIFICANT CHANGE UP (ref 27–34)
MCHC RBC-ENTMCNC: 33.3 GM/DL — SIGNIFICANT CHANGE UP (ref 32–36)
MCV RBC AUTO: 87.2 FL — SIGNIFICANT CHANGE UP (ref 80–100)
NRBC # BLD: 0 /100 WBCS — SIGNIFICANT CHANGE UP (ref 0–0)
PHOSPHATE SERPL-MCNC: 3 MG/DL — SIGNIFICANT CHANGE UP (ref 2.5–4.5)
PLATELET # BLD AUTO: 261 K/UL — SIGNIFICANT CHANGE UP (ref 150–400)
POTASSIUM SERPL-MCNC: 3.1 MMOL/L — LOW (ref 3.5–5.3)
POTASSIUM SERPL-SCNC: 3.1 MMOL/L — LOW (ref 3.5–5.3)
RBC # BLD: 4.78 M/UL — SIGNIFICANT CHANGE UP (ref 4.2–5.8)
RBC # FLD: 12.6 % — SIGNIFICANT CHANGE UP (ref 10.3–14.5)
SARS-COV-2 RNA SPEC QL NAA+PROBE: SIGNIFICANT CHANGE UP
SODIUM SERPL-SCNC: 136 MMOL/L — SIGNIFICANT CHANGE UP (ref 135–145)
WBC # BLD: 7.79 K/UL — SIGNIFICANT CHANGE UP (ref 3.8–10.5)
WBC # FLD AUTO: 7.79 K/UL — SIGNIFICANT CHANGE UP (ref 3.8–10.5)

## 2020-07-19 RX ORDER — POTASSIUM CHLORIDE 20 MEQ
40 PACKET (EA) ORAL EVERY 4 HOURS
Refills: 0 | Status: COMPLETED | OUTPATIENT
Start: 2020-07-19 | End: 2020-07-19

## 2020-07-19 RX ORDER — NYSTATIN CREAM 100000 [USP'U]/G
1 CREAM TOPICAL
Refills: 0 | Status: DISCONTINUED | OUTPATIENT
Start: 2020-07-19 | End: 2020-07-20

## 2020-07-19 RX ADMIN — NYSTATIN CREAM 1 APPLICATION(S): 100000 CREAM TOPICAL at 17:15

## 2020-07-19 RX ADMIN — SENNA PLUS 2 TABLET(S): 8.6 TABLET ORAL at 21:53

## 2020-07-19 RX ADMIN — POLYETHYLENE GLYCOL 3350 17 GRAM(S): 17 POWDER, FOR SOLUTION ORAL at 05:39

## 2020-07-19 RX ADMIN — Medication 40 MILLIEQUIVALENT(S): at 11:29

## 2020-07-19 RX ADMIN — OLANZAPINE 5 MILLIGRAM(S): 15 TABLET, FILM COATED ORAL at 21:54

## 2020-07-19 RX ADMIN — TAMSULOSIN HYDROCHLORIDE 0.4 MILLIGRAM(S): 0.4 CAPSULE ORAL at 21:53

## 2020-07-19 RX ADMIN — POLYETHYLENE GLYCOL 3350 17 GRAM(S): 17 POWDER, FOR SOLUTION ORAL at 17:15

## 2020-07-19 RX ADMIN — Medication 200 MILLIGRAM(S): at 05:39

## 2020-07-19 RX ADMIN — Medication 40 MILLIEQUIVALENT(S): at 13:02

## 2020-07-19 RX ADMIN — OLANZAPINE 2.5 MILLIGRAM(S): 15 TABLET, FILM COATED ORAL at 11:29

## 2020-07-19 RX ADMIN — Medication 200 MILLIGRAM(S): at 17:15

## 2020-07-19 RX ADMIN — NYSTATIN CREAM 1 APPLICATION(S): 100000 CREAM TOPICAL at 05:39

## 2020-07-19 RX ADMIN — AMLODIPINE BESYLATE 10 MILLIGRAM(S): 2.5 TABLET ORAL at 05:39

## 2020-07-19 NOTE — PROGRESS NOTE ADULT - SUBJECTIVE AND OBJECTIVE BOX
PGY-1 Progress Note discussed with attending    PAGER #: [phone]] TILL 5:00 PM  PLEASE CONTACT ON CALL TEAM:  - On Call Team (Please refer to [place]) FROM 5:00 PM - 8:30PM  - Nightfloat Team FROM 8:30 -7:30 AM    CHIEF COMPLAINT & BRIEF HOSPITAL COURSE:  74 year old male with no significant medical history, lives at home, walks with cane presented to ED after he was feeling weak for 3 days. Patient says he does not take any medications and does not follow with any PCP. Patient is admitted for treatment for UTI, pt was on Ceftriaxone, UCx came back + for enterococcus faecalis, currently on Ciprofloxacin. Noted to have scrotal swelling and hematuria on admission. Scrotal U/S shows Right hydrocele and Large bowel containing left inguinal-scrotal hernia. CT urogram was done to assess hematuria, Showed Enlargement of prostate and mas on posterior bladder in addition to pancreatic cyst. MRI shows pancreatic cyst w/o concerning features for high risk stigmata. Tumor markers : CA 19-9 wnl, CEA: 5, PSA 14.40.     patient awaiting D/c to [facility], as per Urology, discharge patient with staley in place, and follow up outpatient.       INTERVAL HPI/OVERNIGHT EVENTS:   Patient seen and examined at bedside, no active complaints. Patient denies any headaches, dizzines, chest pain, sob, cough, N/v/D/c, abdominal pain. Staley is in place draining clear yellow urine.     REVIEW OF SYSTEMS:  CONSTITUTIONAL: No fever, weight loss, or fatigue  RESPIRATORY: No cough, wheezing, chills or hemoptysis; No shortness of breath  CARDIOVASCULAR: No chest pain, palpitations, dizziness, or leg swelling  GASTROINTESTINAL: No abdominal pain. No nausea, vomiting, or hematemesis; No diarrhea or constipation. No melena or hematochezia.  GENITOURINARY: No dysuria or hematuria, urinary frequency  MUSCULOSKELETAL: No pain, Limited ROM  NEUROLOGICAL: No headaches, memory loss, loss of strength, numbness, or tremors  SKIN: No itching, burning, rashes, or lesions     MEDICATIONS  (STANDING):  amLODIPine   Tablet 10 milliGRAM(s) Oral daily  ciprofloxacin   IVPB 400 milliGRAM(s) IV Intermittent every 12 hours  enoxaparin Injectable 40 milliGRAM(s) SubCutaneous daily  nystatin Powder 1 Application(s) Topical two times a day  OLANZapine 2.5 milliGRAM(s) Oral daily  OLANZapine Disintegrating Tablet 5 milliGRAM(s) Oral at bedtime  polyethylene glycol 3350 17 Gram(s) Oral two times a day  potassium chloride   Powder 40 milliEquivalent(s) Oral every 4 hours  senna 2 Tablet(s) Oral at bedtime  tamsulosin 0.4 milliGRAM(s) Oral at bedtime    MEDICATIONS  (PRN):  acetaminophen   Tablet .. 650 milliGRAM(s) Oral every 6 hours PRN Mild Pain (1 - 3)  haloperidol    Injectable 1 milliGRAM(s) IV Push every 6 hours PRN severe agitation      Vital Signs Last 24 Hrs  T(C): 36.4 (19 Jul 2020 04:08), Max: 37.6 (18 Jul 2020 13:58)  T(F): 97.6 (19 Jul 2020 04:08), Max: 99.6 (18 Jul 2020 13:58)  HR: 79 (19 Jul 2020 04:08) (79 - 116)  BP: 126/82 (19 Jul 2020 04:08) (126/82 - 143/88)  BP(mean): --  RR: 18 (19 Jul 2020 04:08) (18 - 18)  SpO2: 97% (19 Jul 2020 04:08) (95% - 98%)    PHYSICAL EXAMINATION:  GENERAL: NAD, well built  HEAD:  Atraumatic, Normocephalic  EYES:  conjunctiva and sclera clear  NECK: Supple, No JVD, Normal thyroid  CHEST/LUNG: Clear to auscultation. Clear to percussion bilaterally; No rales, rhonchi, wheezing, or rubs  HEART: Regular rate and rhythm; No murmurs, rubs, or gallops  ABDOMEN: Soft, Nontender, Nondistended; Bowel sounds present  NERVOUS SYSTEM:  Alert & Oriented X3,    EXTREMITIES:  2+ Peripheral Pulses, No clubbing, cyanosis, or edema  SKIN: warm dry  GENITOURINARY: staley in place, draining clear yellow urine, scrotal swelling.                          13.9   7.79  )-----------( 261      ( 19 Jul 2020 06:51 )             41.7     07-19    136  |  103  |  15  ----------------------------<  115<H>  3.1<L>   |  27  |  0.73    Ca    8.5      19 Jul 2020 06:51  Phos  3.0     07-19  Mg     2.0     07-19    I&O's Summary    18 Jul 2020 07:01  -  19 Jul 2020 07:00  --------------------------------------------------------  IN: 836 mL / OUT: 1400 mL / NET: -564 mL    CAPILLARY BLOOD GLUCOSE      RADIOLOGY & ADDITIONAL TESTS:    < from: MR Abdomen w/wo IV Cont (07.18.20 @ 13:23) >  IMPRESSION:    1.4 cm and 3.0 cm cysts in the pancreatic body and uncinate process, respectively, without concerning features or high-risk stigmata. Initial MRI surveillance every 6 months for a total of 2 years is recommended. If stable, the follow-up interval can be increased.    < end of copied text >

## 2020-07-19 NOTE — PROGRESS NOTE ADULT - PROBLEM SELECTOR PLAN 3
- Springer placed (7/13/2020): drained 1100cc dark tea colored urine with blood tinge, no clots  - Today Springer draining clear yellow  - patient on Flomax  - Renal U/s (7/14): Bilateral hydronephrosis  - Urology consulted: no procedures at this time, D/c With Springer follow up outpatient for +/- cystoscopy and enlarged prostate w/u  -CT Urogram: Enlarged prostate, enhancing mass on bladder base (prostate vs. mass)  - CEA: 5  - PSA: 14.40

## 2020-07-19 NOTE — PROGRESS NOTE ADULT - SUBJECTIVE AND OBJECTIVE BOX
Patient is a 74y old  Male who presents with a chief complaint of Weakness (19 Jul 2020 11:57)    PATIENT IS SEEN AND EXAMINED IN MEDICAL FLOOR.    FLY [ X  ]        ALLERGIES:  No Known Allergies      VITALS:    Vital Signs Last 24 Hrs  T(C): 37 (19 Jul 2020 14:00), Max: 37.5 (18 Jul 2020 20:32)  T(F): 98.6 (19 Jul 2020 14:00), Max: 99.5 (18 Jul 2020 20:32)  HR: 90 (19 Jul 2020 14:00) (79 - 112)  BP: 128/76 (19 Jul 2020 14:00) (126/82 - 143/88)  BP(mean): --  RR: 18 (19 Jul 2020 14:00) (18 - 18)  SpO2: 98% (19 Jul 2020 14:00) (95% - 98%)    LABS:    CBC Full  -  ( 19 Jul 2020 06:51 )  WBC Count : 7.79 K/uL  RBC Count : 4.78 M/uL  Hemoglobin : 13.9 g/dL  Hematocrit : 41.7 %  Platelet Count - Automated : 261 K/uL  Mean Cell Volume : 87.2 fl  Mean Cell Hemoglobin : 29.1 pg  Mean Cell Hemoglobin Concentration : 33.3 gm/dL  Auto Neutrophil # : x  Auto Lymphocyte # : x  Auto Monocyte # : x  Auto Eosinophil # : x  Auto Basophil # : x  Auto Neutrophil % : x  Auto Lymphocyte % : x  Auto Monocyte % : x  Auto Eosinophil % : x  Auto Basophil % : x      07-19    136  |  103  |  15  ----------------------------<  115<H>  3.1<L>   |  27  |  0.73    Ca    8.5      19 Jul 2020 06:51  Phos  3.0     07-19  Mg     2.0     07-19      Creatinine Trend: 0.73<--, 0.69<--, 0.69<--, 0.73<--, 0.87<--, 0.93<--  I&O's Summary    18 Jul 2020 07:01  -  19 Jul 2020 07:00  --------------------------------------------------------  IN: 836 mL / OUT: 1400 mL / NET: -564 mL    19 Jul 2020 07:01  -  19 Jul 2020 17:02  --------------------------------------------------------  IN: 0 mL / OUT: 400 mL / NET: -400 mL      .Urine Clean Catch (Midstream)  07-11 @ 19:17   >100,000 CFU/ml Enterococcus faecalis  --  Enterococcus faecalis      .Blood Blood  07-11 @ 15:19   No Growth Final  --  --      .Blood Blood  07-11 @ 15:18   No Growth Final  --  --          MEDICATIONS:    MEDICATIONS  (STANDING):  amLODIPine   Tablet 10 milliGRAM(s) Oral daily  ciprofloxacin   IVPB 400 milliGRAM(s) IV Intermittent every 12 hours  enoxaparin Injectable 40 milliGRAM(s) SubCutaneous daily  nystatin Powder 1 Application(s) Topical two times a day  OLANZapine 2.5 milliGRAM(s) Oral daily  OLANZapine Disintegrating Tablet 5 milliGRAM(s) Oral at bedtime  polyethylene glycol 3350 17 Gram(s) Oral two times a day  senna 2 Tablet(s) Oral at bedtime  tamsulosin 0.4 milliGRAM(s) Oral at bedtime      MEDICATIONS  (PRN):  acetaminophen   Tablet .. 650 milliGRAM(s) Oral every 6 hours PRN Mild Pain (1 - 3)  haloperidol    Injectable 1 milliGRAM(s) IV Push every 6 hours PRN severe agitation        REVIEW OF SYSTEMS:                           ALL ROS DONE [ X   ]      CONSTITUTIONAL:  LETHARGIC [   ], FEVER [   ], UNRESPONSIVE [   ]  CVS:  CP  [   ], SOB, [   ], PALPITATIONS [   ], DIZZYNESS [   ]  RS: COUGH [   ], SPUTUM [   ]  GI: ABDOMINAL PAIN [   ], NAUSEA [   ], VOMITINGS [   ], DIARRHEA [   ], CONSTIPATION [   ]  :  DYSURIA [   ], NOCTURIA [   ], INCREASED FREQUENCY [   ], DRIBLING [   ],  SKELETAL: PAINFUL JOINTS [   ], SWOLLEN JOINTS [   ], NECK ACHE [   ], LOW BACK ACHE [   ],  SKIN : ULCERS [   ], RASH [   ], ITCHING [   ]  CNS: HEAD ACHE [   ], DOUBLE VISION [   ], BLURRED VISION [   ], AMS / CONFUSION [   ], SEIZURES [   ], WEAKNESS [   ],TINGLING / NUMBNESS [   ]      PHYSICAL EXAMINATION:    GENERAL APPEARANCE: NO DISTRESS  HEENT:  NO PALLOR, NO  JVD,  NO   NODES, NECK SUPPLE  CVS: S1 +, S2 +,   RS: AEEB,  OCCASIONAL  RALES +,   NO RONCHI  ABD: SOFT, NT, NO, BS +  EXT: NO PE  SKIN: WARM,   SKELETAL:  ROM ACCEPTABLE  CNS:  AAO X 2-3   , NO  DEFICITS        RADIOLOGY :    < from: MR Abdomen w/wo IV Cont (07.18.20 @ 13:23) >  IMPRESSION:    1.4 cm and 3.0 cm cysts in the pancreatic body and uncinate process, respectively, without concerning features or high-risk stigmata. Initial MRI surveillance every 6 months for a total of 2 years is recommended. If stable, the follow-up interval can be increased.    < end of copied text >    < from: US Testicles (07.14.20 @ 10:15) >  IMPRESSION:     Normal bilateral testes.    Large right hydrocele.    Large bowel containing left inguinal-scrotal hernia.    < end of copied text >      < from: US Renal (07.14.20 @ 10:12) >  Impression: Bilateral hydronephrosis as described left worse than right.    < end of copied text >    < from: CT Abdomen and Pelvis w/wo IV Cont (07.16.20 @ 16:22) >    IMPRESSION:   Markedly enlarged prostate. Enhancing mass at the bladder base may represent intravesicular extension of the prostate however bladder mass cannot be excluded. Recommend cystoscopy. Correlate clinically for prostate cancer. A graft Bill balloon abuts the right ureteral orifice.    Mild right hydroureteronephrosis. Large right extrarenal pelvis. Small left extrarenal pelvis.    Left inguinal hernia containing large and small bowel loops without obstruction. Sigmoid diverticulosis without diverticulitis. Mild constipation.    9 mm pancreatic body cyst. Recommend comparison with any prior studies to ensure stability. If none are available, recommend MRI to evaluate for IPMN.    Cardiomegaly. Trace pericardial effusion and trace bilateral pleural effusions.    Large right hydrocele.    < end of copied text >      ASSESSMENT :     Weakness      PLAN:  HPI:  74 year old male with no significant medical history, lives at home, walks with cane presented to ED after he was feeling weak for 3 days. Patient says he does not take any medications and does not follow with any PCP.  Patient denies medical problems in past, does not go for routine screening test. Patient denies abdominal pain, fever, nausea,, pain, cough, palpitations , nausea , vomiting, bowel or urinary problems. Patient denies any numbness, weakness, dizziness , head ache or any other problems. (11 Jul 2020 12:47)    #  DC PLAN IN AM TO COMPLETE ANTIBIOTIC ORALLY ( CIPROFLOXACIN ) [facility]   # ACUTE METABOLIC ENCEPHALOPATHY DUE TO INFECTION - RESOLVING - CAN DC ALL ANTIPSYCHOTICS NOW AND OBSERVE  # URINARY TRACT INFECTION, URINARY RETENTION, BPH, SUSPECT CANCER PROSTATE, BLADDER TUMOR , BILATERAL HYDRONEPHROSIS, UTI WITH E. FAECALIS, - S/P RENAL  ULTRASOUND, ON CIPROFLOXACIN, ON BILL,  FLOMAX - UROLOGY CONSULT IN PROGRESS, OUT PATIENT CYSTOSCOPY WHEN STABLE FOR FURTHER  EVALUATION AND MANAGEMENT   # ONCOLOGY CONSULT TO F/UP  # PANCREATIC CYSTS   # CONSTIPATION  ON MIRALAX, SENNA  # RIGHT SIDED HYDROCELE, AND LEFT INGUINAL HERNIA - UROLOGY CONSULT IN PROGRESS, SURGERY CONSULT IN PROGRESS - REPAIR AS AN OUT PATIENT WHEN STABLE  # MODERATE PCM  # PT/OT EVALUATION RECOMMENDED SUBACUTE REHAB  # CASE DISCUSSED EXTENSIVELY WITH BROTHER LAWRENCE KU, [phone]. PATIENT AND BROTHER AGREED TO SUBACUTE REHAB - SOCIAL WORK / CASE MANAGEMENT IS COORDINATING  # GI AND DVT PPX    -  DR. SOFIYA GILL

## 2020-07-20 ENCOUNTER — TRANSCRIPTION ENCOUNTER (OUTPATIENT)
Age: 75
End: 2020-07-20

## 2020-07-20 VITALS — TEMPERATURE: 98 F

## 2020-07-20 LAB
ANION GAP SERPL CALC-SCNC: 7 MMOL/L — SIGNIFICANT CHANGE UP (ref 5–17)
BUN SERPL-MCNC: 18 MG/DL — SIGNIFICANT CHANGE UP (ref 7–18)
CALCIUM SERPL-MCNC: 8.2 MG/DL — LOW (ref 8.4–10.5)
CHLORIDE SERPL-SCNC: 105 MMOL/L — SIGNIFICANT CHANGE UP (ref 96–108)
CO2 SERPL-SCNC: 24 MMOL/L — SIGNIFICANT CHANGE UP (ref 22–31)
CREAT SERPL-MCNC: 0.89 MG/DL — SIGNIFICANT CHANGE UP (ref 0.5–1.3)
GLUCOSE SERPL-MCNC: 98 MG/DL — SIGNIFICANT CHANGE UP (ref 70–99)
HCT VFR BLD CALC: 42.5 % — SIGNIFICANT CHANGE UP (ref 39–50)
HGB BLD-MCNC: 14.4 G/DL — SIGNIFICANT CHANGE UP (ref 13–17)
MCHC RBC-ENTMCNC: 29.6 PG — SIGNIFICANT CHANGE UP (ref 27–34)
MCHC RBC-ENTMCNC: 33.9 GM/DL — SIGNIFICANT CHANGE UP (ref 32–36)
MCV RBC AUTO: 87.3 FL — SIGNIFICANT CHANGE UP (ref 80–100)
NRBC # BLD: 0 /100 WBCS — SIGNIFICANT CHANGE UP (ref 0–0)
PLATELET # BLD AUTO: 268 K/UL — SIGNIFICANT CHANGE UP (ref 150–400)
POTASSIUM SERPL-MCNC: 3.6 MMOL/L — SIGNIFICANT CHANGE UP (ref 3.5–5.3)
POTASSIUM SERPL-SCNC: 3.6 MMOL/L — SIGNIFICANT CHANGE UP (ref 3.5–5.3)
RBC # BLD: 4.87 M/UL — SIGNIFICANT CHANGE UP (ref 4.2–5.8)
RBC # FLD: 12.8 % — SIGNIFICANT CHANGE UP (ref 10.3–14.5)
SODIUM SERPL-SCNC: 136 MMOL/L — SIGNIFICANT CHANGE UP (ref 135–145)
WBC # BLD: 8.43 K/UL — SIGNIFICANT CHANGE UP (ref 3.8–10.5)
WBC # FLD AUTO: 8.43 K/UL — SIGNIFICANT CHANGE UP (ref 3.8–10.5)

## 2020-07-20 PROCEDURE — 84443 ASSAY THYROID STIM HORMONE: CPT

## 2020-07-20 PROCEDURE — 86769 SARS-COV-2 COVID-19 ANTIBODY: CPT

## 2020-07-20 PROCEDURE — 86900 BLOOD TYPING SEROLOGIC ABO: CPT

## 2020-07-20 PROCEDURE — 81001 URINALYSIS AUTO W/SCOPE: CPT

## 2020-07-20 PROCEDURE — 74019 RADEX ABDOMEN 2 VIEWS: CPT

## 2020-07-20 PROCEDURE — 97162 PT EVAL MOD COMPLEX 30 MIN: CPT

## 2020-07-20 PROCEDURE — 82746 ASSAY OF FOLIC ACID SERUM: CPT

## 2020-07-20 PROCEDURE — 87040 BLOOD CULTURE FOR BACTERIA: CPT

## 2020-07-20 PROCEDURE — 93005 ELECTROCARDIOGRAM TRACING: CPT

## 2020-07-20 PROCEDURE — 82803 BLOOD GASES ANY COMBINATION: CPT

## 2020-07-20 PROCEDURE — 80053 COMPREHEN METABOLIC PANEL: CPT

## 2020-07-20 PROCEDURE — 99285 EMERGENCY DEPT VISIT HI MDM: CPT | Mod: 25

## 2020-07-20 PROCEDURE — 82607 VITAMIN B-12: CPT

## 2020-07-20 PROCEDURE — 86901 BLOOD TYPING SEROLOGIC RH(D): CPT

## 2020-07-20 PROCEDURE — 86850 RBC ANTIBODY SCREEN: CPT

## 2020-07-20 PROCEDURE — 85610 PROTHROMBIN TIME: CPT

## 2020-07-20 PROCEDURE — 74178 CT ABD&PLV WO CNTR FLWD CNTR: CPT

## 2020-07-20 PROCEDURE — 76870 US EXAM SCROTUM: CPT

## 2020-07-20 PROCEDURE — 85027 COMPLETE CBC AUTOMATED: CPT

## 2020-07-20 PROCEDURE — 96374 THER/PROPH/DIAG INJ IV PUSH: CPT

## 2020-07-20 PROCEDURE — 36415 COLL VENOUS BLD VENIPUNCTURE: CPT

## 2020-07-20 PROCEDURE — 82652 VIT D 1 25-DIHYDROXY: CPT

## 2020-07-20 PROCEDURE — 83880 ASSAY OF NATRIURETIC PEPTIDE: CPT

## 2020-07-20 PROCEDURE — 74183 MRI ABD W/O CNTR FLWD CNTR: CPT

## 2020-07-20 PROCEDURE — 80061 LIPID PANEL: CPT

## 2020-07-20 PROCEDURE — 83036 HEMOGLOBIN GLYCOSYLATED A1C: CPT

## 2020-07-20 PROCEDURE — U0003: CPT

## 2020-07-20 PROCEDURE — 84100 ASSAY OF PHOSPHORUS: CPT

## 2020-07-20 PROCEDURE — 84484 ASSAY OF TROPONIN QUANT: CPT

## 2020-07-20 PROCEDURE — 80048 BASIC METABOLIC PNL TOTAL CA: CPT

## 2020-07-20 PROCEDURE — 82378 CARCINOEMBRYONIC ANTIGEN: CPT

## 2020-07-20 PROCEDURE — 87086 URINE CULTURE/COLONY COUNT: CPT

## 2020-07-20 PROCEDURE — 82962 GLUCOSE BLOOD TEST: CPT

## 2020-07-20 PROCEDURE — 86803 HEPATITIS C AB TEST: CPT

## 2020-07-20 PROCEDURE — 74018 RADEX ABDOMEN 1 VIEW: CPT

## 2020-07-20 PROCEDURE — 85730 THROMBOPLASTIN TIME PARTIAL: CPT

## 2020-07-20 PROCEDURE — 97530 THERAPEUTIC ACTIVITIES: CPT

## 2020-07-20 PROCEDURE — 86301 IMMUNOASSAY TUMOR CA 19-9: CPT

## 2020-07-20 PROCEDURE — 97110 THERAPEUTIC EXERCISES: CPT

## 2020-07-20 PROCEDURE — 70450 CT HEAD/BRAIN W/O DYE: CPT

## 2020-07-20 PROCEDURE — 83605 ASSAY OF LACTIC ACID: CPT

## 2020-07-20 PROCEDURE — 87186 SC STD MICRODIL/AGAR DIL: CPT

## 2020-07-20 PROCEDURE — 83735 ASSAY OF MAGNESIUM: CPT

## 2020-07-20 PROCEDURE — 76775 US EXAM ABDO BACK WALL LIM: CPT

## 2020-07-20 PROCEDURE — 71045 X-RAY EXAM CHEST 1 VIEW: CPT

## 2020-07-20 PROCEDURE — G0103: CPT

## 2020-07-20 RX ORDER — TAMSULOSIN HYDROCHLORIDE 0.4 MG/1
1 CAPSULE ORAL
Qty: 0 | Refills: 0 | DISCHARGE
Start: 2020-07-20

## 2020-07-20 RX ORDER — AMLODIPINE BESYLATE 2.5 MG/1
1 TABLET ORAL
Qty: 0 | Refills: 0 | DISCHARGE
Start: 2020-07-20

## 2020-07-20 RX ORDER — POLYETHYLENE GLYCOL 3350 17 G/17G
17 POWDER, FOR SOLUTION ORAL
Qty: 0 | Refills: 0 | DISCHARGE
Start: 2020-07-20

## 2020-07-20 RX ORDER — SENNA PLUS 8.6 MG/1
2 TABLET ORAL
Qty: 0 | Refills: 0 | DISCHARGE
Start: 2020-07-20

## 2020-07-20 RX ORDER — MOXIFLOXACIN HYDROCHLORIDE TABLETS, 400 MG 400 MG/1
1 TABLET, FILM COATED ORAL
Qty: 14 | Refills: 0
Start: 2020-07-20 | End: 2020-07-26

## 2020-07-20 RX ADMIN — OLANZAPINE 2.5 MILLIGRAM(S): 15 TABLET, FILM COATED ORAL at 12:19

## 2020-07-20 RX ADMIN — Medication 200 MILLIGRAM(S): at 05:57

## 2020-07-20 RX ADMIN — POLYETHYLENE GLYCOL 3350 17 GRAM(S): 17 POWDER, FOR SOLUTION ORAL at 05:58

## 2020-07-20 RX ADMIN — AMLODIPINE BESYLATE 10 MILLIGRAM(S): 2.5 TABLET ORAL at 05:57

## 2020-07-20 RX ADMIN — NYSTATIN CREAM 1 APPLICATION(S): 100000 CREAM TOPICAL at 05:58

## 2020-07-20 NOTE — PROGRESS NOTE ADULT - PROBLEM SELECTOR PLAN 6
- Lower abdominal swelling  - abdominal xray shows stool unchanged from previous studies  - will start Senna and Miralax  - patient had a bowel movement today  - resolved
- Lower abdominal swelling  - abdominal xray shows stool unchanged from previous studies  - will start Senna and Miralax  - patient had a bowel movement today  - resolved
- Lower abdominal swelling  - abdominal xray shows stool unchanged from previous studies  - will start Senna and Miralax  - patient had a bowel movement today  - F/u with abdominal Xray to confirm
- Lower abdominal swelling  - abdominal xray shows stool unchanged from previous studies  - will start Senna and Miralax  - Patient accepted Tap water enema  - patient had a bowel movement today
- Lower abdominal swelling  - abdominal xray shows stool unchanged from previous studies  - will start Senna and Miralax  - patient had a bowel movement today  - resolved
IMPROVE VTE Individual Risk Assessment  RISK                                                                Points  [  ] Previous VTE                                                  3  [  ] Thrombophilia                                               2  [  ] Lower limb paralysis                                      2        (unable to hold up >15 seconds)    [  ] Current Cancer                                              2         (within 6 months)  [x  ] Immobilization > 24 hrs                                1  [  ] ICU/CCU stay > 24 hours                              1  [x  ] Age > 60                                                      1  IMPROVE VTE Score : 2  will start the patient 0n lovenox.

## 2020-07-20 NOTE — PROGRESS NOTE ADULT - PROBLEM SELECTOR PROBLEM 1
UTI (urinary tract infection)

## 2020-07-20 NOTE — PROGRESS NOTE ADULT - PROBLEM SELECTOR PROBLEM 3
Urinary retention
Weakness
Urinary retention

## 2020-07-20 NOTE — PROGRESS NOTE ADULT - PROBLEM SELECTOR PLAN 1
-Patient presented after weakness.  -UA is positive for WBC 11-25, Leukocyte esterase moderate.  -UCx: Enterococcus faecalis, sensitive to Ciprofloxacin  -Started Ciprofloxacin   -WBC 11.57 today  -Springer in place, showing clear yellow urine
-Patient presented after weakness.  -UA is positive for WBC 11-25, Leukocyte esterase moderate.  -UCx: Enterococcus faecalis, sensitive to Ciprofloxacin  -Started Ciprofloxacin   -WBC 7.90 today  -Springer in place, showing clear yellow urine  - Resolved  - Finish course of Abx
-Patient presented after weakness.  -UA is positive for WBC 11-25, Leukocyte esterase moderate.  -UCx: Enterococcus faecalis, sensitive to Ciprofloxacin  -Started Ciprofloxacin   -WBC tending down, 10.24 today  -Springer in place, showing hematuria
-Patient presented after weakness.  -UA is positive for WBC 11-25, Leukocyte esterase moderate.  -UCx: Gram positive organisim, Pending sensitivities  -s/p condem catheter placement in ED  -WBC tending down, 12.65 today  -currently on Ceftriaxone 1000mg IV q 24
-Patient presented after weakness.  -UA is positive for WBC 11-25, Leukocyte esterase moderate.  -s/p condem catheter placement in ED  -UCx: pending  -WBC tending up, 16.45 today  -currently on Ceftriaxone 1000mg IV q 24  -WBC 11.40.  - Follow urine cultures.  -will start the patient on Rocephin.
-Patient presented after weakness.  -UA is positive for WBC 11-25, Leukocyte esterase moderate.  -UCx: Enterococcus faecalis, sensitive to Ciprofloxacin  -Started Ciprofloxacin   -WBC 7.90 today  -Springer in place, showing clear yellow urine  - Resolved  - Finish course of Abx
-Patient presented after weakness.  -UA is positive for WBC 11-25, Leukocyte esterase moderate.  -UCx: Enterococcus faecalis, sensitive to Ciprofloxacin  -Started Ciprofloxacin   -WBC 7.90 today  -Springer in place, showing clear yellow urine  - Resolved  - Finish course of Abx

## 2020-07-20 NOTE — PROGRESS NOTE ADULT - PROBLEM SELECTOR PROBLEM 2
Scrotal hernia
Scrotal swelling
Scrotal swelling
Scrotal hernia
Scrotal hernia

## 2020-07-20 NOTE — PROGRESS NOTE ADULT - PROBLEM SELECTOR PLAN 4
-Patient was confused and disoriented on 7/15 and was trying to leave AMA  -De escalation and orientation techniques did not work, code gray was called.   -Patient received Haldol for sedation  -Psychiatry saw the patient  -Starting on Olanzapine 5mg at bedtime and 2.5mg in the daytime  - Patient more calm and agreeable during the day
-Patient was confused and disoriented on 7/15 and was trying to leave AMA  -De escalation and orientation techniques did not work, code gray was called.   -Patient received Haldol for sedation  -Psychiatry saw the patient  -Starting on Olanzapine 5mg at bedtime and 2.5mg in the daytime  - Patient more calm and agreeable during the day
- Lower abdominal swelling  - abdominal xray shows stool unchanged from previous studies  - will start Senna and Miralax
-Patient was confused and disoriented on 7/15 and was trying to leave AMA  -De escalation and orientation techniques did not work, code gray was called.   -Patient received Haldol for sedation  -Psychiatry saw the patient  -Starting on Olanzapine 5mg at bedtime and 2.5mg in the daytime  - Patient more calm and agreeable during the day
-Patient presented with Weakness.  -can be secondary to infection.  -PT eval: Recommend [facility]  -TSH, HbA1c, vitamin B 12, Folate, vitamin D levels all within normal limits
-Patient was confused and disoriented on 7/15 and was trying to leave AMA  -De escalation and orientation techniques did not work, code gray was called.   -Patient received Haldol for sedation  -Psychiatry saw the patient  -Starting on Olanzapine 5mg at bedtime and 2.5mg in the daytime  - Patient more calm and agreeable during the day
-Patient was confused and disoriented today and was trying to leave AMA  -De escalation and orientation techniques did not work, code gray was called.   -Patient received Haldol for sedation  -Psychiatry saw the patient  -Starting on Olanzapine 5mg at bedtime and 2.5mg in the daytime

## 2020-07-20 NOTE — PROGRESS NOTE ADULT - SUBJECTIVE AND OBJECTIVE BOX
PGY-1 Progress Note discussed with attending    PAGER #: [[phone]] TILL 5:00 PM  PLEASE CONTACT ON CALL TEAM:  - On Call Team (Please refer to [facility]) FROM 5:00 PM - 8:30PM  - Nightfloat Team FROM 8:30 -7:30 AM    CHIEF COMPLAINT & BRIEF HOSPITAL COURSE:  74 year old male with no significant medical history, lives at home, walks with cane presented to ED after he was feeling weak for 3 days. Patient says he does not take any medications and does not follow with any PCP. Patient is admitted for treatment for UTI, pt was on Ceftriaxone, UCx came back + for enterococcus faecalis, currently on Ciprofloxacin. Noted to have scrotal swelling and hematuria on admission. Scrotal U/S shows Right hydrocele and Large bowel containing left inguinal-scrotal hernia. CT urogram was done to assess hematuria, Showed Enlargement of prostate and mas on posterior bladder in addition to pancreatic cyst. MRI shows pancreatic cyst w/o concerning features for high risk stigmata. Tumor markers : CA 19-9 wnl, CEA: 5, PSA 14.40.         INTERVAL HPI/OVERNIGHT EVENTS:   Patient seen and examined at bedside, no active complaints. Patient denies any headaches, dizzines, chest pain, sob, cough, N/v/D/c, abdominal pain. Staley is in place draining clear yellow urine.     [facility] in [facility].       REVIEW OF SYSTEMS:  CONSTITUTIONAL: No fever, weight loss, or fatigue  RESPIRATORY: No cough, wheezing, chills or hemoptysis; No shortness of breath  CARDIOVASCULAR: No chest pain, palpitations, dizziness, or leg swelling  GASTROINTESTINAL: No abdominal pain. No nausea, vomiting, or hematemesis; No diarrhea or constipation. No melena or hematochezia.  GENITOURINARY: No dysuria or hematuria, urinary frequency  MUSCULOSKELETAL: No pain, noLimited ROM  NEUROLOGICAL: No headaches, memory loss, loss of strength, numbness, or tremors  SKIN: No itching, burning, rashes, or lesions     MEDICATIONS  (STANDING):  amLODIPine   Tablet 10 milliGRAM(s) Oral daily  ciprofloxacin   IVPB 400 milliGRAM(s) IV Intermittent every 12 hours  enoxaparin Injectable 40 milliGRAM(s) SubCutaneous daily  nystatin Powder 1 Application(s) Topical two times a day  OLANZapine 2.5 milliGRAM(s) Oral daily  OLANZapine Disintegrating Tablet 5 milliGRAM(s) Oral at bedtime  polyethylene glycol 3350 17 Gram(s) Oral two times a day  senna 2 Tablet(s) Oral at bedtime  tamsulosin 0.4 milliGRAM(s) Oral at bedtime    MEDICATIONS  (PRN):  acetaminophen   Tablet .. 650 milliGRAM(s) Oral every 6 hours PRN Mild Pain (1 - 3)  haloperidol    Injectable 1 milliGRAM(s) IV Push every 6 hours PRN severe agitation      Vital Signs Last 24 Hrs  T(C): 37.3 (20 Jul 2020 05:27), Max: 37.3 (20 Jul 2020 05:27)  T(F): 99.1 (20 Jul 2020 05:27), Max: 99.1 (20 Jul 2020 05:27)  HR: 106 (20 Jul 2020 05:27) (90 - 106)  BP: 142/78 (20 Jul 2020 05:27) (128/76 - 142/78)  BP(mean): --  RR: 18 (20 Jul 2020 05:27) (18 - 18)  SpO2: 95% (20 Jul 2020 05:27) (94% - 98%)    PHYSICAL EXAMINATION:  GENERAL: NAD, well built  HEAD:  Atraumatic, Normocephalic  EYES:  conjunctiva and sclera clear  NECK: Supple, No JVD, Normal thyroid  CHEST/LUNG: Clear to auscultation. Clear to percussion bilaterally; No rales, rhonchi, wheezing, or rubs  HEART: Regular rate and rhythm; No murmurs, rubs, or gallops  ABDOMEN: Soft, Nontender, Nondistended; Bowel sounds present  NERVOUS SYSTEM:  Alert & Oriented X3,    EXTREMITIES:  2+ Peripheral Pulses, No clubbing, cyanosis, or edema  SKIN: warm dry  GENITOURINARY: staley in place, draining clear yellow urine, scrotal swelling.                            13.9   7.79  )-----------( 261      ( 19 Jul 2020 06:51 )             41.7     07-19    136  |  103  |  15  ----------------------------<  115<H>  3.1<L>   |  27  |  0.73    Ca    8.5      19 Jul 2020 06:51  Phos  3.0     07-19  Mg     2.0     07-19      I&O's Summary    19 Jul 2020 07:01  -  20 Jul 2020 07:00  --------------------------------------------------------  IN: 0 mL / OUT: 1350 mL / NET: -1350 mL          RADIOLOGY & ADDITIONAL TESTS: PGY-1 Progress Note discussed with attending    PAGER #: [[phone]] TILL 5:00 PM  PLEASE CONTACT ON CALL TEAM:  - On Call Team (Please refer to [facility]) FROM 5:00 PM - 8:30PM  - Nightfloat Team FROM 8:30 -7:30 AM    CHIEF COMPLAINT & BRIEF HOSPITAL COURSE:  74 year old male with no significant medical history, lives at home, walks with cane presented to ED after he was feeling weak for 3 days. Patient says he does not take any medications and does not follow with any PCP. Patient is admitted for treatment for UTI, pt was on Ceftriaxone, UCx came back + for enterococcus faecalis, currently on Ciprofloxacin. Noted to have scrotal swelling and hematuria on admission. Scrotal U/S shows Right hydrocele and Large bowel containing left inguinal-scrotal hernia. CT urogram was done to assess hematuria, Showed Enlargement of prostate and mas on posterior bladder in addition to pancreatic cyst. MRI shows pancreatic cyst w/o concerning features for high risk stigmata. Tumor markers : CA 19-9 wnl, CEA: 5, PSA 14.40.         INTERVAL HPI/OVERNIGHT EVENTS:   Patient seen and examined at bedside, no active complaints. Patient denies any headaches, dizzines, chest pain, sob, cough, N/v/D/c, abdominal pain. Staley is in place draining clear yellow urine.     Patient awaiting [facility] in [facility].       REVIEW OF SYSTEMS:  CONSTITUTIONAL: No fever, weight loss, or fatigue  RESPIRATORY: No cough, wheezing, chills or hemoptysis; No shortness of breath  CARDIOVASCULAR: No chest pain, palpitations, dizziness, or leg swelling  GASTROINTESTINAL: No abdominal pain. No nausea, vomiting, or hematemesis; No diarrhea or constipation. No melena or hematochezia.  GENITOURINARY: No dysuria or hematuria, urinary frequency  MUSCULOSKELETAL: No pain, noLimited ROM  NEUROLOGICAL: No headaches, memory loss, loss of strength, numbness, or tremors  SKIN: No itching, burning, rashes, or lesions     MEDICATIONS  (STANDING):  amLODIPine   Tablet 10 milliGRAM(s) Oral daily  ciprofloxacin   IVPB 400 milliGRAM(s) IV Intermittent every 12 hours  enoxaparin Injectable 40 milliGRAM(s) SubCutaneous daily  nystatin Powder 1 Application(s) Topical two times a day  OLANZapine 2.5 milliGRAM(s) Oral daily  OLANZapine Disintegrating Tablet 5 milliGRAM(s) Oral at bedtime  polyethylene glycol 3350 17 Gram(s) Oral two times a day  senna 2 Tablet(s) Oral at bedtime  tamsulosin 0.4 milliGRAM(s) Oral at bedtime    MEDICATIONS  (PRN):  acetaminophen   Tablet .. 650 milliGRAM(s) Oral every 6 hours PRN Mild Pain (1 - 3)  haloperidol    Injectable 1 milliGRAM(s) IV Push every 6 hours PRN severe agitation      Vital Signs Last 24 Hrs  T(C): 37.3 (20 Jul 2020 05:27), Max: 37.3 (20 Jul 2020 05:27)  T(F): 99.1 (20 Jul 2020 05:27), Max: 99.1 (20 Jul 2020 05:27)  HR: 106 (20 Jul 2020 05:27) (90 - 106)  BP: 142/78 (20 Jul 2020 05:27) (128/76 - 142/78)  BP(mean): --  RR: 18 (20 Jul 2020 05:27) (18 - 18)  SpO2: 95% (20 Jul 2020 05:27) (94% - 98%)    PHYSICAL EXAMINATION:  GENERAL: NAD, well built  HEAD:  Atraumatic, Normocephalic  EYES:  conjunctiva and sclera clear  NECK: Supple, No JVD, Normal thyroid  CHEST/LUNG: Clear to auscultation. Clear to percussion bilaterally; No rales, rhonchi, wheezing, or rubs  HEART: Regular rate and rhythm; No murmurs, rubs, or gallops  ABDOMEN: Soft, Nontender, Nondistended; Bowel sounds present  NERVOUS SYSTEM:  Alert & Oriented X3,    EXTREMITIES:  2+ Peripheral Pulses, No clubbing, cyanosis, or edema  SKIN: warm dry  GENITOURINARY: staley in place, draining clear yellow urine, scrotal swelling.                          14.4   8.43  )-----------( 268      ( 20 Jul 2020 10:13 )             42.5   07-20    136  |  105  |  18  ----------------------------<  98  3.6   |  24  |  0.89    Ca    8.2<L>      20 Jul 2020 10:13  Phos  3.0     07-19  Mg     2.0     07-19      I&O's Summary    19 Jul 2020 07:01  -  20 Jul 2020 07:00  --------------------------------------------------------  IN: 0 mL / OUT: 1350 mL / NET: -1350 mL          RADIOLOGY & ADDITIONAL TESTS:

## 2020-07-20 NOTE — PROGRESS NOTE ADULT - PROVIDER SPECIALTY LIST ADULT
Heme/Onc
Internal Medicine
Urology
Internal Medicine

## 2020-07-20 NOTE — PROGRESS NOTE ADULT - PROBLEM SELECTOR PLAN 5
-Patient presented with Weakness.  -can be secondary to infection.  -PT eval: Recommend [facility]  -TSH, HbA1c, vitamin B 12, Folate, vitamin D levels all within normal limits
-Patient presented with Weakness.  -can be secondary to infection.  -PT eval: Recommend [facility]  -TSH, HbA1c, vitamin B 12, Folate, vitamin D levels all within normal limits
IMPROVE VTE Individual Risk Assessment  RISK                                                                Points  [  ] Previous VTE                                                  3  [  ] Thrombophilia                                               2  [  ] Lower limb paralysis                                      2        (unable to hold up >15 seconds)    [  ] Current Cancer                                              2         (within 6 months)  [x  ] Immobilization > 24 hrs                                1  [  ] ICU/CCU stay > 24 hours                              1  [x  ] Age > 60                                                      1  IMPROVE VTE Score : 2  will start the patient 0n lovenox.
-Patient presented with Weakness.  -can be secondary to infection.  -PT eval: Recommend [facility]  -TSH, HbA1c, vitamin B 12, Folate, vitamin D levels all within normal limits
- Lower abdominal swelling  - abdominal xray shows stool unchanged from previous studies  - will start Senna and Miralax  - Patient accepted Tap water enema
-Patient presented with Weakness.  -can be secondary to infection.  -PT eval: Recommend [facility]  -TSH, HbA1c, vitamin B 12, Folate, vitamin D levels all within normal limits
-Patient presented with Weakness.  -can be secondary to infection.  -PT eval: Recommend [facility]  -TSH, HbA1c, vitamin B 12, Folate, vitamin D levels all within normal limits

## 2020-07-20 NOTE — DISCHARGE NOTE NURSING/CASE MANAGEMENT/SOCIAL WORK - PATIENT PORTAL LINK FT
You can access the FollowMyHealth Patient Portal offered by [facility] by registering at the following website: [email or web address]. By joining [facility]’s FollowMyHealth portal, you will also be able to view your health information using other applications (apps) compatible with our system.

## 2020-07-20 NOTE — PROGRESS NOTE ADULT - PROBLEM SELECTOR PLAN 2
- Patient noted to have scrotal swelling, lower abdominal pain, and hematuria  - Urology consulted: staley in place  - Scrotal Ultrasound: Large right hydrocele. Large bowel containing left inguinal-scrotal hernia.  - General Surgery: recommend surgical intervention as outpatient
- Patient noted to have scrotal swelling, lower abdominal pain, and hematuria  - Urology consulted: staley in place  - Scrotal Ultrasound: Large right hydrocele. Large bowel containing left inguinal-scrotal hernia.  -F/u with Urology  - Surgical Consult for hernia
- Patient noted to have scrotal swelling, lower abdominal pain, and hematuria  - Urology consulted  - Scrotal Ultrasound pending due to patient High suspicion Covid status
- Patient noted to have scrotal swelling, lower abdominal pain, and hematuria  - Urology consulted: staley in place  - Scrotal Ultrasound: Large right hydrocele. Large bowel containing left inguinal-scrotal hernia.  - General Surgery: recommend surgical intervention as outpatient

## 2020-07-21 PROBLEM — Z00.00 ENCOUNTER FOR PREVENTIVE HEALTH EXAMINATION: Status: ACTIVE | Noted: 2020-07-21

## 2020-07-24 ENCOUNTER — APPOINTMENT (OUTPATIENT)
Dept: UROLOGY | Facility: CLINIC | Age: 75
End: 2020-07-24
Payer: MEDICARE

## 2020-07-24 ENCOUNTER — APPOINTMENT (OUTPATIENT)
Dept: UROLOGY | Facility: CLINIC | Age: 75
End: 2020-07-24

## 2020-07-24 VITALS
WEIGHT: 160 LBS | HEIGHT: 67 IN | BODY MASS INDEX: 25.11 KG/M2 | HEART RATE: 95 BPM | SYSTOLIC BLOOD PRESSURE: 122 MMHG | DIASTOLIC BLOOD PRESSURE: 79 MMHG | TEMPERATURE: 98.4 F

## 2020-07-24 DIAGNOSIS — Z78.9 OTHER SPECIFIED HEALTH STATUS: ICD-10-CM

## 2020-07-24 PROCEDURE — 99214 OFFICE O/P EST MOD 30 MIN: CPT

## 2020-07-31 PROBLEM — Z78.9 NO PERTINENT PAST MEDICAL HISTORY: Status: RESOLVED | Noted: 2020-07-31 | Resolved: 2020-07-31

## 2020-07-31 RX ORDER — TAMSULOSIN HYDROCHLORIDE 0.4 MG/1
CAPSULE ORAL
Refills: 0 | Status: ACTIVE | COMMUNITY

## 2020-07-31 NOTE — HISTORY OF PRESENT ILLNESS
[FreeTextEntry1] : cc retention \par 73 yo male recent d/c from [facility]\par was c/o hematuria/incontinence\par staley placed > 1 l \par ct showed large mass v s median lobe \par ucx was pos\par feels well\par no fever

## 2020-07-31 NOTE — PHYSICAL EXAM
[General Appearance - Well Developed] : well developed [General Appearance - Well Nourished] : well nourished [Normal Appearance] : normal appearance [Well Groomed] : well groomed [General Appearance - In No Acute Distress] : no acute distress [Edema] : no peripheral edema [Respiration, Rhythm And Depth] : normal respiratory rhythm and effort [Exaggerated Use Of Accessory Muscles For Inspiration] : no accessory muscle use [Abdomen Soft] : soft [Abdomen Tenderness] : non-tender [Costovertebral Angle Tenderness] : no ~M costovertebral angle tenderness [Urethral Meatus] : meatus normal [Scrotum] : the scrotum was normal [Urinary Bladder Findings] : the bladder was normal on palpation [FreeTextEntry1] : left hernia right hydrocele  foely clear urine  [] : no rash [Normal Station and Gait] : the gait and station were normal for the patient's age [No Focal Deficits] : no focal deficits [Oriented To Time, Place, And Person] : oriented to person, place, and time [Affect] : the affect was normal [Not Anxious] : not anxious [Mood] : the mood was normal [No Palpable Adenopathy] : no palpable adenopathy

## 2020-08-07 ENCOUNTER — APPOINTMENT (OUTPATIENT)
Dept: UROLOGY | Facility: CLINIC | Age: 75
End: 2020-08-07
Payer: MEDICARE

## 2020-08-07 PROCEDURE — 52000 CYSTOURETHROSCOPY: CPT

## 2020-08-17 NOTE — DISCHARGE NOTE NURSING/CASE MANAGEMENT/SOCIAL WORK - NSDPACMPNY_GEN_ALL_CORE
Impression: Other anophthalmos: Q11.1. Plan: Irritation and Discharge due to build up on the prosthesis and mechanical irritation. Cleaned and polished prosthesis today. Use Artificial Tears PRN. Alaway BID OU PRN. Return to Clinic in 3 months for PRFU and further evaluation and management.
Caregiver

## 2020-08-21 ENCOUNTER — APPOINTMENT (OUTPATIENT)
Dept: UROLOGY | Facility: CLINIC | Age: 75
End: 2020-08-21
Payer: MEDICARE

## 2020-08-21 PROCEDURE — 99213 OFFICE O/P EST LOW 20 MIN: CPT

## 2020-08-30 NOTE — PHYSICAL EXAM
[General Appearance - Well Developed] : well developed [Normal Appearance] : normal appearance [General Appearance - Well Nourished] : well nourished [Well Groomed] : well groomed [Abdomen Soft] : soft [General Appearance - In No Acute Distress] : no acute distress [Abdomen Tenderness] : non-tender [Costovertebral Angle Tenderness] : no ~M costovertebral angle tenderness [Urethral Meatus] : meatus normal [Urinary Bladder Findings] : the bladder was normal on palpation [Scrotum] : the scrotum was normal [Testes Mass (___cm)] : there were no testicular masses [No Prostate Nodules] : no prostate nodules [] : no respiratory distress [Edema] : no peripheral edema [Respiration, Rhythm And Depth] : normal respiratory rhythm and effort [Exaggerated Use Of Accessory Muscles For Inspiration] : no accessory muscle use [Oriented To Time, Place, And Person] : oriented to person, place, and time [Affect] : the affect was normal [Not Anxious] : not anxious [Mood] : the mood was normal [Normal Station and Gait] : the gait and station were normal for the patient's age [No Focal Deficits] : no focal deficits [No Palpable Adenopathy] : no palpable adenopathy [FreeTextEntry1] : staley lear urine

## 2020-08-30 NOTE — HISTORY OF PRESENT ILLNESS
[FreeTextEntry1] : cc retention \par 75 yo male recent d/c from [facility]\par was c/o hematuria/incontinence\par staley placed > 1 l \par ct showed large mass v s median lobe \par ucx was pos\par feels well\par no fever \par cysto last visit large irregular prostate with calc some urethral trauma appears ballon had pulle into urethra \par doing well

## 2020-09-02 ENCOUNTER — APPOINTMENT (OUTPATIENT)
Dept: UROLOGY | Facility: CLINIC | Age: 75
End: 2020-09-02
Payer: MEDICARE

## 2020-09-02 DIAGNOSIS — R33.9 RETENTION OF URINE, UNSPECIFIED: ICD-10-CM

## 2020-09-02 PROCEDURE — 51797 INTRAABDOMINAL PRESSURE TEST: CPT | Mod: TC

## 2020-09-02 PROCEDURE — 51741 ELECTRO-UROFLOWMETRY FIRST: CPT

## 2020-09-02 PROCEDURE — 51798 US URINE CAPACITY MEASURE: CPT | Mod: 26,59

## 2020-09-02 PROCEDURE — 51728 CYSTOMETROGRAM W/VP: CPT | Mod: 26

## 2020-09-02 PROCEDURE — 51798 US URINE CAPACITY MEASURE: CPT | Mod: 59

## 2020-09-02 PROCEDURE — 51784 ANAL/URINARY MUSCLE STUDY: CPT

## 2020-09-02 PROCEDURE — 51784 ANAL/URINARY MUSCLE STUDY: CPT | Mod: TC

## 2020-09-02 PROCEDURE — 51797 INTRAABDOMINAL PRESSURE TEST: CPT | Mod: 26

## 2020-09-03 PROBLEM — R33.9 RETENTION OF URINE: Status: ACTIVE | Noted: 2020-07-30

## 2020-09-09 ENCOUNTER — APPOINTMENT (OUTPATIENT)
Dept: UROLOGY | Facility: CLINIC | Age: 75
End: 2020-09-09

## 2022-03-23 NOTE — DISCHARGE NOTE PROVIDER - NSTOBACCOUSAGEY/N_GEN_A_CS
GI Daily Progress Note    Pt seen in follow up for Constipation. Subjective:  Notified by nursing, no BM x 6 days  No emesis  NPO with NG to LIS per ICU team due to RP hematoma  Intubated and sedated    Objective:  Vital signs in last 24 hours:  Temp:  [98.2 Â°F (36.8 Â°C)-99.5 Â°F (37.5 Â°C)] 99.5 Â°F (37.5 Â°C)  Heart Rate:  [115-172] 115  Resp:  [16-24] 21  BP: (110)/(63) 110/63  Arterial Line BP: ()/(50-69) 108/64  FiO2 (%):  [60 %] 60 %    Physical Exam:   Physical Exam  Vitals reviewed. Constitutional:       Comments: Intubated and sedated   HENT:      Head: Normocephalic and atraumatic. Mouth/Throat:      Mouth: Mucous membranes are moist.      Pharynx: Oropharynx is clear. Neck: Neck supple. Eyes:      General: No scleral icterus. Cardiovascular:      Rate and Rhythm: Regular rhythm. Tachycardia present. Comments: IABP  Pulmonary:      Effort: Pulmonary effort is normal.      Breath sounds: Normal breath sounds. Abdominal:      General: Abdomen is flat. There is no distension. Palpations: Abdomen is soft. There is no mass. Tenderness: There is no abdominal tenderness. There is no guarding. Hernia: No hernia is present. Comments: Bowel sounds hypoactive  NGT to list, minimal yellow output, tiny red clot in tube, unclear if 2/2 insertion or from UGIB. Musculoskeletal:         General: No swelling. Skin:     General: Skin is warm and dry.             Results:  Recent Results (from the past 24 hour(s))   GLUCOSE, BEDSIDE - POINT OF CARE    Collection Time: 03/22/22  3:08 PM   Result Value Ref Range    GLUCOSE, BEDSIDE - POINT OF CARE 138 (H) 70 - 99 mg/dL   GLUCOSE, BEDSIDE - POINT OF CARE    Collection Time: 03/22/22  4:02 PM   Result Value Ref Range    GLUCOSE, BEDSIDE - POINT OF CARE 132 (H) 70 - 99 mg/dL   GLUCOSE, BEDSIDE - POINT OF CARE    Collection Time: 03/22/22  4:58 PM   Result Value Ref Range    GLUCOSE, BEDSIDE - POINT OF CARE 160 (H) 70 - 99 mg/dL CBC No Differential    Collection Time: 03/22/22  5:16 PM   Result Value Ref Range    WBC 24.3 (H) 4.2 - 11.0 K/mcL    RBC 2.82 (L) 4.50 - 5.90 mil/mcL    HGB 8.3 (L) 13.0 - 17.0 g/dL    HCT 24.6 (L) 39.0 - 51.0 %    MCV 87.2 78.0 - 100.0 fl    MCH 29.4 26.0 - 34.0 pg    MCHC 33.7 32.0 - 36.5 g/dL    PLT 55 (L) 140 - 450 K/mcL    RDW-CV 15.2 (H) 11.0 - 15.0 %    RDW-SD 47.5 39.0 - 50.0 fL    NRBC 0 <=0 /100 WBC   BLOOD GAS, ARTERIAL WITH COOXIMETRY - RESPIRATORY    Collection Time: 03/22/22  5:27 PM   Result Value Ref Range    BASE EXCESS / DEFICIT, ARTERIAL - RESPIRATORY 0 -2 - 3 mmol/L    HCO3, ARTERIAL - RESPIRATORY 24 22 - 28 mmol/L    O2 CONTENT, ARTERIAL - RESPIRATORY 12 (L) 15 - 23 %    PCO2, ARTERIAL - RESPIRATORY 36 35 - 48 mm Hg    PH, ARTERIAL - RESPIRATORY 7.43 7.35 - 7.45 Units    PO2, ARTERIAL - RESPIRATORY 86 83 - 108 mm Hg    O2 SATURATION, ARTERIAL - RESPIRATORY 98 95 - 99 %    CONDITION - RESPIRATORY ;AC RR24  +5 60%     CARBOXYHEMOGLOBIN - RESPIRATORY 1.0 (L) 1.5 - 15.0 %    HEMOGLOBIN - RESPIRATORY 8.8 (L) 13.0 - 17.0 g/dL    METHEMOGLOBIN - RESPIRATORY 1.2 <=1.6 %    OXYHEMOGLOBIN, ARTERIAL - RESPIRATORY 95.8 94.0 - 98.0 %    SITE - RESPIRATORY Arterial Line     TEMPERATURE - RESPIRATORY 37.0 degrees   POTASSIUM - RESPIRATORY    Collection Time: 03/22/22  5:27 PM   Result Value Ref Range    POTASSIUM - RESPIRATORY 4.8 3.4 - 5.1 mmol/L   SODIUM - RESPIRATORY    Collection Time: 03/22/22  5:27 PM   Result Value Ref Range    SODIUM - RESPIRATORY 135 135 - 145 mmol/L   CALCIUM, IONIZED - RESPIRATORY    Collection Time: 03/22/22  5:27 PM   Result Value Ref Range    CALCIUM, IONIZED - RESPIRATORY 1.12 (L) 1.15 - 1.29 mmol/L   LACTIC ACID, ARTERIAL - RESPIRATORY    Collection Time: 03/22/22  5:27 PM   Result Value Ref Range    LACTIC ACID, ARTERIAL - RESPIRATORY 1.8 (H) <1.6 mmol/L   GLUCOSE, BEDSIDE - POINT OF CARE    Collection Time: 03/22/22  6:06 PM   Result Value Ref Range    GLUCOSE, BEDSIDE - POINT OF CARE 179 (H) 70 - 99 mg/dL   GLUCOSE, BEDSIDE - POINT OF CARE    Collection Time: 03/22/22  6:53 PM   Result Value Ref Range    GLUCOSE, BEDSIDE - POINT OF CARE 224 (H) 70 - 99 mg/dL   GLUCOSE, BEDSIDE - POINT OF CARE    Collection Time: 03/22/22  7:26 PM   Result Value Ref Range    GLUCOSE, BEDSIDE - POINT OF CARE 185 (H) 70 - 99 mg/dL   Magnesium    Collection Time: 03/22/22  8:03 PM   Result Value Ref Range    Magnesium 2.3 1.7 - 2.4 mg/dL   Phosphorus    Collection Time: 03/22/22  8:03 PM   Result Value Ref Range    Phosphorus 2.7 2.4 - 4.7 mg/dL   Basic Metabolic Panel    Collection Time: 03/22/22  8:03 PM   Result Value Ref Range    Fasting Status      Sodium 137 135 - 145 mmol/L    Potassium 4.5 3.4 - 5.1 mmol/L    Chloride 107 98 - 107 mmol/L    Carbon Dioxide 24 21 - 32 mmol/L    Anion Gap 11 10 - 20 mmol/L    Glucose 229 (H) 70 - 99 mg/dL    BUN 24 (H) 6 - 20 mg/dL    Creatinine 1.39 (H) 0.67 - 1.17 mg/dL    Glomerular Filtration Rate 64 >=60    BUN/ Creatinine Ratio 17 7 - 25    Calcium 7.4 (L) 8.4 - 10.2 mg/dL   GLUCOSE, BEDSIDE - POINT OF CARE    Collection Time: 03/22/22  8:58 PM   Result Value Ref Range    GLUCOSE, BEDSIDE - POINT OF CARE 201 (H) 70 - 99 mg/dL   GLUCOSE, BEDSIDE - POINT OF CARE    Collection Time: 03/22/22  9:55 PM   Result Value Ref Range    GLUCOSE, BEDSIDE - POINT OF CARE 194 (H) 70 - 99 mg/dL   GLUCOSE, BEDSIDE - POINT OF CARE    Collection Time: 03/22/22 10:56 PM   Result Value Ref Range    GLUCOSE, BEDSIDE - POINT OF CARE 161 (H) 70 - 99 mg/dL   GLUCOSE, BEDSIDE - POINT OF CARE    Collection Time: 03/23/22 12:03 AM   Result Value Ref Range    GLUCOSE, BEDSIDE - POINT OF CARE 160 (H) 70 - 99 mg/dL   GLUCOSE, BEDSIDE - POINT OF CARE    Collection Time: 03/23/22  1:08 AM   Result Value Ref Range    GLUCOSE, BEDSIDE - POINT OF CARE 157 (H) 70 - 99 mg/dL   GLUCOSE, BEDSIDE - POINT OF CARE    Collection Time: 03/23/22  2:15 AM   Result Value Ref Range    GLUCOSE, BEDSIDE - POINT OF CARE 150 (H) 70 - 99 mg/dL   GLUCOSE, BEDSIDE - POINT OF CARE    Collection Time: 03/23/22  2:59 AM   Result Value Ref Range    GLUCOSE, BEDSIDE - POINT OF CARE 150 (H) 70 - 99 mg/dL   GLUCOSE, BEDSIDE - POINT OF CARE    Collection Time: 03/23/22  3:50 AM   Result Value Ref Range    GLUCOSE, BEDSIDE - POINT OF CARE 145 (H) 70 - 99 mg/dL   Phosphorus    Collection Time: 03/23/22  3:55 AM   Result Value Ref Range    Phosphorus 2.5 2.4 - 4.7 mg/dL   Comprehensive Metabolic Panel    Collection Time: 03/23/22  3:55 AM   Result Value Ref Range    Fasting Status      Sodium 137 135 - 145 mmol/L    Potassium 4.3 3.4 - 5.1 mmol/L    Chloride 105 98 - 107 mmol/L    Carbon Dioxide 25 21 - 32 mmol/L    Anion Gap 11 10 - 20 mmol/L    Glucose 148 (H) 70 - 99 mg/dL    BUN 26 (H) 6 - 20 mg/dL    Creatinine 1.47 (H) 0.67 - 1.17 mg/dL    Glomerular Filtration Rate 60 >=60    BUN/ Creatinine Ratio 18 7 - 25    Calcium 7.4 (L) 8.4 - 10.2 mg/dL    Bilirubin, Total 0.9 0.2 - 1.0 mg/dL    GOT/AST 49 (H) <=37 Units/L    GPT/ALT 61 <64 Units/L    Alkaline Phosphatase 28 (L) 45 - 117 Units/L    Albumin 2.2 (L) 3.6 - 5.1 g/dL    Protein, Total 5.6 (L) 6.4 - 8.2 g/dL    Globulin 3.4 2.0 - 4.0 g/dL    A/G Ratio 0.6 (L) 1.0 - 2.4   Magnesium    Collection Time: 03/23/22  3:55 AM   Result Value Ref Range    Magnesium 2.4 1.7 - 2.4 mg/dL   CBC with Automated Differential (performable only)    Collection Time: 03/23/22  3:55 AM   Result Value Ref Range    WBC 22.2 (H) 4.2 - 11.0 K/mcL    RBC 2.59 (L) 4.50 - 5.90 mil/mcL    HGB 7.7 (L) 13.0 - 17.0 g/dL    HCT 22.8 (L) 39.0 - 51.0 %    MCV 88.0 78.0 - 100.0 fl    MCH 29.7 26.0 - 34.0 pg    MCHC 33.8 32.0 - 36.5 g/dL    RDW-CV 15.1 (H) 11.0 - 15.0 %    RDW-SD 47.8 39.0 - 50.0 fL    PLT 79 (L) 140 - 450 K/mcL    NRBC 1 (H) <=0 /100 WBC   D Dimer, Quantitative    Collection Time: 03/23/22  3:55 AM   Result Value Ref Range    D Dimer, Quantitative 19.82 (H) <0.57 mg/L (FEU)   Manual Differential    Collection Time: 03/23/22  3:55 AM   Result Value Ref Range    Neutrophil, Percent 81 %    Lymphocytes, Percent 5 %    Mono, Percent 12 %    Eosinophils, Percent 0 %    Basophils, Percent 0 %    Bands, Percent 1 0 - 10 %    Reactive Lymphocytes, Percent 1 0 - 5 %    Absolute Neutrophil 18.2 (H) 1.8 - 7.7 K/mcL    Absolute Lymphocytes 1.3 1.0 - 4.0 K/mcL    Absolute Monocytes 2.7 (H) 0.3 - 0.9 K/mcL    Absolute Eosinophils 0.0 0.0 - 0.5 K/mcL    Absolute Basophils 0.0 0.0 - 0.3 K/mcL    WBC Morphology Normal Normal    Platelet Morphology Normal Normal    Polychromasia Few    Triglyceride    Collection Time: 03/23/22  3:55 AM   Result Value Ref Range    Fasting Status      Triglycerides 130 <=149 mg/dL   BLOOD GAS, ARTERIAL WITH COOXIMETRY - RESPIRATORY    Collection Time: 03/23/22  3:56 AM   Result Value Ref Range    BASE EXCESS / DEFICIT, ARTERIAL - RESPIRATORY 1 -2 - 3 mmol/L    HCO3, ARTERIAL - RESPIRATORY 25 22 - 28 mmol/L    O2 CONTENT, ARTERIAL - RESPIRATORY 11 (L) 15 - 23 %    PCO2, ARTERIAL - RESPIRATORY 36 35 - 48 mm Hg    PH, ARTERIAL - RESPIRATORY 7.44 7.35 - 7.45 Units    PO2, ARTERIAL - RESPIRATORY 82 (L) 83 - 108 mm Hg    O2 SATURATION, ARTERIAL - RESPIRATORY 98 95 - 99 %    CONDITION - RESPIRATORY AC RR24  FIO2 0.60 PEEP5     CARBOXYHEMOGLOBIN - RESPIRATORY 1.2 (L) 1.5 - 15.0 %    HEMOGLOBIN - RESPIRATORY 8.1 (L) 13.0 - 17.0 g/dL    METHEMOGLOBIN - RESPIRATORY 1.1 <=1.6 %    OXYHEMOGLOBIN, ARTERIAL - RESPIRATORY 95.4 94.0 - 98.0 %    TEMPERATURE - RESPIRATORY 37.0 degrees   POTASSIUM - RESPIRATORY    Collection Time: 03/23/22  3:56 AM   Result Value Ref Range    POTASSIUM - RESPIRATORY 4.5 3.4 - 5.1 mmol/L   SODIUM - RESPIRATORY    Collection Time: 03/23/22  3:56 AM   Result Value Ref Range    SODIUM - RESPIRATORY 134 (L) 135 - 145 mmol/L   CALCIUM, IONIZED - RESPIRATORY    Collection Time: 03/23/22  3:56 AM   Result Value Ref Range    CALCIUM, IONIZED - RESPIRATORY 1.11 (L) 1.15 - 1.29 mmol/L   LACTIC ACID, ARTERIAL - RESPIRATORY    Collection Time: 03/23/22  3:56 AM   Result Value Ref Range    LACTIC ACID, ARTERIAL - RESPIRATORY 1.5 <1.6 mmol/L   BLOOD GAS, MIXED VENOUS WITH COOXIMETRY - RESPIRATORY    Collection Time: 03/23/22  4:27 AM   Result Value Ref Range    CONDITION - RESPIRATORY AC RR24  FIO2 0.60 PEEP5     CONDITION - RESPIRATORY      FIO2 - RESPIRATORY      HEMOGLOBIN - RESPIRATORY 7.7 (L) 13.0 - 17.0 g/dL    BASE EXCESS / DEFICIT, MIXED VENOUS - RESPIRATORY 4 mmol/L    CARBOXYHEMOGLOBIN, MIXED VENOUS - RESPIRATORY 2 %    HCO3, MIXED VENOUS - RESPIRATORY 29 mmol/L    METHEMOGLOBIN - RESPIRATORY 0.2 <=1.6 %    OXYHEMOGLOBIN, MIXED VENOUS - RESPIRATORY 67.9 %    O2 CONTENT, MIXED VENOUS - RESPIRATORY 7 %    PCO2, MIXED VENOUS - RESPIRATORY 43 mm Hg    PH, MIXED VENOUS - RESPIRATORY 7.44 Units    PO2, MIXED VENOUS - RESPIRATORY 40 mm Hg    O2 SATURATION, MIXED VENOUS - RESPIRATORY 69 %    SITE - RESPIRATORY Venous Line     TEMPERATURE - RESPIRATORY 37.0 degrees   GLUCOSE, BEDSIDE - POINT OF CARE    Collection Time: 03/23/22  5:06 AM   Result Value Ref Range    GLUCOSE, BEDSIDE - POINT OF CARE 140 (H) 70 - 99 mg/dL   GLUCOSE, BEDSIDE - POINT OF CARE    Collection Time: 03/23/22  6:04 AM   Result Value Ref Range    GLUCOSE, BEDSIDE - POINT OF CARE 131 (H) 70 - 99 mg/dL   GLUCOSE, BEDSIDE - POINT OF CARE    Collection Time: 03/23/22  7:02 AM   Result Value Ref Range    GLUCOSE, BEDSIDE - POINT OF CARE 151 (H) 70 - 99 mg/dL   GLUCOSE, BEDSIDE - POINT OF CARE    Collection Time: 03/23/22  8:15 AM   Result Value Ref Range    GLUCOSE, BEDSIDE - POINT OF CARE 112 (H) 70 - 99 mg/dL   GLUCOSE, BEDSIDE - POINT OF CARE    Collection Time: 03/23/22  9:04 AM   Result Value Ref Range    GLUCOSE, BEDSIDE - POINT OF CARE 170 (H) 70 - 99 mg/dL   Prepare Red Blood Cells: 1 Units    Collection Time: 03/23/22  9:59 AM   Result Value Ref Range    UNIT BLOOD TYPE B Pos     ISBT BLOOD TYPE 7300 BLOOD EXPIRATION DATE [id number]     UNIT NUMBER [id number]     DISPENSE STATUS Issued     PRODUCT ID Red Blood Cells     PRODUCT CODE [id number]     PRODUCT DESCRIPTION RBC AS-1 LR     CROSSMATCH RESULT Compatible     ISSUE DATE/TIME [id number]    GLUCOSE, BEDSIDE - POINT OF CARE    Collection Time: 03/23/22 10:03 AM   Result Value Ref Range    GLUCOSE, BEDSIDE - POINT OF CARE 149 (H) 70 - 99 mg/dL   GLUCOSE, BEDSIDE - POINT OF CARE    Collection Time: 03/23/22 11:07 AM   Result Value Ref Range    GLUCOSE, BEDSIDE - POINT OF CARE 161 (H) 70 - 99 mg/dL   GLUCOSE, BEDSIDE - POINT OF CARE    Collection Time: 03/23/22 12:07 PM   Result Value Ref Range    GLUCOSE, BEDSIDE - POINT OF CARE 157 (H) 70 - 99 mg/dL   GLUCOSE, BEDSIDE - POINT OF CARE    Collection Time: 03/23/22  1:02 PM   Result Value Ref Range    GLUCOSE, BEDSIDE - POINT OF CARE 153 (H) 70 - 99 mg/dL   GLUCOSE, BEDSIDE - POINT OF CARE    Collection Time: 03/23/22  2:28 PM   Result Value Ref Range    GLUCOSE, BEDSIDE - POINT OF CARE 148 (H) 70 - 99 mg/dL       Imaging:    XR ABDOMEN 1 VIEW   Final Result      Prominent large bowel loops in the midabdomen, nonspecific and may   represent ileus or developing distal large bowel obstruction. Electronically Signed by: Radha Kaur MD    Signed on: 3/23/2022 1:48 PM          XR CHEST PA OR AP 1 VIEW   Final Result   FINDINGS/IMPRESSION:      There are residual bilateral (left side more extensive than right) basilar   infiltrative/atelectatic changes which, accounting for differences in   technical factors, appear similar to the findings observed on the most   recent examination of the preceding day. There are endotracheal and nasogastric tubes. There is a left jugular   central venous catheter. The right jugular introducer/sheath. There is an   intra-aortic balloon pump. The positions of the aforementioned devices   appear satisfactory. There are degenerative changes of the thoracic spine. The heart size is normal.  There is slight relative elevation of the right   hemidiaphragm. There is no demonstrable pneumothorax. Electronically Signed by: Cassidy Vaughn MD    Signed on: 3/22/2022 10:52 AM          XR CHEST AP OR PA 1 VIEW   Final Result   FINDINGS/IMPRESSION:      Since the preceding examination of the same day, there has been the   insertion of a left jugular central venous catheter (the tip of which is   identified in the projection of the right atrium) and of a nasogastric tube   (the distal end of which is identified in the projection of the stomach). There is an endotracheal tube. There is a right jugular introducer/sheath. There is an intra-aortic balloon pump. There are degenerative changes of   the thoracic spine. The heart size is normal.  There is slight relative   elevation of the right hemidiaphragm      There is no demonstrable pneumothorax      Electronically Signed by: Cassidy Vaughn MD    Signed on: 3/21/2022 11:45 AM          XR ABDOMEN 1 VIEW   Final Result   FINDINGS/IMPRESSION:      Prior feeding tube has been removed. The NG tube tip and sidehole both   project in the gastric fundus. Electronically Signed by: Sina Murry    Signed on: 3/21/2022 11:46 AM          CT HEAD WO CONTRAST   Final Result      1. No acute intracranial process. 2. Chronic right MCA infarctions. Electronically Signed by: David Eduardo MD    Signed on: 3/21/2022 9:06 AM          CT CHEST ABDOMEN PELVIS WO CONTRAST   Final Result   1. Large left retroperitoneal and pelvis hematoma measuring 19 cm.   2. Sigmoid diverticulitis. 3. Small right lower lobe consolidation. 4. Moderate cardiomegaly and a small pericardial effusion. 5. Redemonstration pancreatic head mass. 6. Left nephrolithiasis. Findings were communicated to GUERLINE Shankar by Dr. Jyothi Rondon on 03/21/2022 at   9:17 AM and read back occurred.       Electronically Signed by: Derrick Navarro M.D. Signed on: 3/21/2022 9:25 AM          XR CHEST PA OR AP 1 VIEW   Final Result   Tip of endotracheal tube is 2.6 cm above rosita. Marker of AIBP is seen at the proximal descending aorta. Electronically Signed by: Maksim Albarran M.D. Signed on: 3/21/2022 6:56 AM          XR CHEST AP OR PA 1 VIEW   Final Result   1. Interval removal of the right-sided [place]-Alison catheter. The catheter    sheath terminates in the region of the SVC. Intra-aortic balloon pump is    seen approximately 1.3 cm below the top of the aortic arch. 2.  No acute disease identified. Electronically signed by Sherron Madden M.D. on 03 21 22 at 05:45      XR CHEST PA OR AP 1 VIEW   Final Result   Impression:    Stable exam as above. Support devices as above. Electronically Signed by: Sharifa Gloria MD    Signed on: 3/20/2022 10:10 AM          XR CHEST PA OR AP 1 VIEW   Final Result   Impression:    Stable exam as above. Support devices as above. Electronically Signed by: Sharifa Gloria MD    Signed on: 3/19/2022 9:09 AM          XR CHEST AP OR PA 1 VIEW   Final Result   1. Lines and tubes in similar, expected positions. 2.   No new acute cardiopulmonary abnormality. Electronically Signed by: Kimber Jett M.D. Signed on: 3/18/2022 9:24 AM          CT LIVER MULTIPHASIC W WO CONTRAST   Final Result   1. There is a 1.3 x 2.1 cm low-density lesion ( 4 HU)  in the neck of the   pancreas abutting the portal mesenteric confluence. Possibility of a   pancreatic neoplasm cannot be excluded. 2. small pericardial effusion. 3. small low density liver lesion are too small, but may represent cysts. 4. Bilateral renal cysts. Left renal calculi. Further evaluation is   advised. Electronically Signed by: Vinay Guthrie MD    Signed on: 3/17/2022 1:27 PM          XR CHEST AP OR PA 1 VIEW   Final Result   Findings/impression: The IABP balloon marker is 4.8 cm below the superior   margin of the aorta. Right IJ [place]-Alison catheter is seen within the right   proximal pulmonary artery. The heart size is unchanged. Vessels are   slightly cephalized. There is mildly hazy density within the right upper   lobe medially which is nonspecific and may reflect superimposed vessels or   atelectasis. No pneumothorax is present. There is probable mild left lung   base atelectasis. .         Electronically Signed by: Gildardo Jonas D.O. Signed on: 3/17/2022 9:34 AM          XR CHEST AP OR PA 1 VIEW   Final Result      XR CHEST AP OR PA 1 VIEW   Final Result   Findings/impression: Right IJ [place]-Al[place] catheter is seen within the right   pulmonary artery. The IABP marker is 6.5 cm below the superior margin of   the aortic arch. This is seen at the level of T6 which is similar to   prior. The heart size is stable. Vessels are mildly cephalized. Slight   increased density at the left lung base may reflect atelectasis. No   pneumothorax is evident. Electronically Signed by: Gildardo Jonas D.O. Signed on: 3/16/2022 10:43 AM          XR CHEST AP OR PA 1 VIEW   Final Result       Intra-aortic balloon pump marker similar the previous exam.  Stable chest      Electronically Signed by: Emil Cortes M.D    Signed on: 3/15/2022 2:42 PM          XR CHEST AP OR PA 1 VIEW   Final Result    Lines unchanged as described above. Stable bibasilar airspace disease which could represent atelectasis,   aspiration or pneumonia. Electronically Signed by: Jose Luis Mata M.D. Signed on: 3/15/2022 10:09 AM          XR CHEST AP OR PA 1 VIEW   Final Result      No acute abnormalities. Devices as above. Electronically Signed by: Artem Hummel M.D. Signed on: 3/14/2022 8:54 AM          XR CHEST AP OR PA 1 VIEW   Final Result   Impression:    Cardiac silhouettes mildly enlarged but stable. The IABP marker is   approximately 58 mm from the top the aortic arch.  Right IJ [place]-Al[place]   catheter terminates in the pulmonary outflow tract. No pneumothorax or   effusions. Vascularity is normal.      Remainder of the exam is otherwise stable. Electronically Signed by: Karol Crenshaw MD    Signed on: 3/13/2022 7:24 PM          CT CHEST ABDOMEN PELVIS WO CONTRAST   Final Result      1. Moderate cardiomegaly and small pericardial effusion. 2.   Sludge and/or stones in the gallbladder. 3.   Nonobstructive bilateral renal calculi. 4.   Prostatomegaly. 5.   Wide necked paraumbilical hernia containing a loop of nonobstructed   small bowel. 6.   Tip of a right femoral venous catheter tip within a branch of the   external lumbar venous plexus on the right; consider repositioning. Other   devices as above. Electronically Signed by: Tara Durbin M.D. Signed on: 3/13/2022 1:53 PM          CT HEAD WO CONTRAST   Final Result   No acute intracranial hemorrhage or hydrocephalus or midline shift. Moderate generalized atrophy. Moderate periventricular and subcortical   white matter chronic small vessel ischemic changes. Old right basal   ganglia and right parietal  and temporal lobe infarct. Acute right maxillary sinusitis. Electronically Signed by: Arjun Odell MD    Signed on: 3/13/2022 2:45 PM          CT MANDIBLE WO CONTRAST   Final Result   No acute intracranial hemorrhage or hydrocephalus or midline shift. Moderate generalized atrophy. Moderate periventricular and subcortical   white matter chronic small vessel ischemic changes. Old right basal   ganglia and right parietal  and temporal lobe infarct. Acute right maxillary sinusitis. Electronically Signed by: Arjun Odell MD    Signed on: 3/13/2022 2:45 PM          XR CHEST AP OR PA 1 VIEW   Final Result   Findings/impression: Right IJ [place]-Alison catheter tip is seen within the   right pulmonary artery. The IABP balloon marker is 4.9 cm below the   superior margin of the aortic arch.   There is a catheter-like density   overlying the left axilla. The heart size is stable. Vessels are slightly   cephalized. No consolidation or pneumothorax is present. Electronically Signed by: Claudy Scott D.O. Signed on: 3/13/2022 9:55 AM          US VAS CAROTID DUPLEX BILATERAL   Final Result      1. No evidence for hemodynamically significant (<50% stenosis) internal   carotid stenosis  of the left carotid artery. The right carotid artery   cannot be assessed due to overlying bandages and a [place]-Alison catheter. Reference guidelines:*    Normal: ICA PSV <125 cm/s; ICA/CCA PSV ratio<2.0      <50% stenosis: ICA PSV<125 cm/s; ICA/CCA PSV ratio<2.0      50-69% stenosis: ICA PSV- 125-230 cm/s; ICA/CCA PSV ratio - 2.0-4.0      >70% stenosis: ICA PSV>230 cm/s; ICA/CCA PSV THMVV>5.1      * Applicability also depends on other clinical factors and waveform   analysis. Electronically Signed by: Claudy Scott D.O. Signed on: 3/12/2022 11:32 AM          XR CHEST AP OR PA 1 VIEW   Final Result       Improvement in left greater than right bibasilar pleural-parenchymal   opacities. Devices as above. Electronically Signed by: Christiano Marin M.D. Signed on: 3/12/2022 9:35 AM          [facility] EXTREMITY LOWER VENOUS DUPLEX   Final Result   1. Persistent nonocclusive thrombus throughout the right lower extremity    which now extends into the posterior tibial and peroneal calf veins. 2.  Persistent nonocclusive thrombus within the left femoral vein. Electronically signed by Teresa Jacinto M.D. on 03 12 22 at 01:34      [facility]   Final Result   1. Suboptimal evaluation. 2.  Visualized liver unremarkable. 3.  Unremarkable gallbladder. No gallstones or biliary dilatation. 4.  Right renal cyst.   5.  Nonobstructing left renal calculi.             Electronically signed by Teresa Jacinto M.D. on 03 12 22 at 01:20      XR CHEST AP OR PA 1 VIEW   Final Result    Mild cardiomegaly with hypoaeration and right lower lobe segmental atelectasis suggested. Electronically Signed by: Cristiano Rodriguez DO    Signed on: 3/11/2022 9:25 PM          XR ABDOMEN 1 VIEW   Final Result   No evidence of an acute intraabdominal process. Electronically Signed by: Cristiano Rodriguez DO    Signed on: 3/11/2022 4:52 PM          XR CHEST AP OR PA 1 VIEW   Final Result   Interval placement of IABP in satisfactory position. Stable cardiomegaly and suspected retrocardiac airspace opacity. Electronically signed by Rebeka Wolf M.D. on 03 11 22 at 06:00       [address]    (Results Pending)       Assessment & Plan:  65-year-old male, currently intubated on a ventilator, on aortic balloon pump, with recent pulmonary embolism, requiring emergency balloon pump placement. He was found to have a left atrial thrombus also an atrial flutter that was out of control. He is currently being evaluated for heart failure surgery such as ventricular assist device or transplant. 1. Pre-LVAD/OHT eval  2. Hepatic cyst  3. Pancreatic lesion  - From records, does not appear that the pt smokes or consumes ETOH    - CT CAP WO CON 3/13/22 - Diverticulosis of the sigmoid colon. Wide necked paraumbilical hernia measuring approximately 3.8 x 1.7 cm containing a loop of nonobstructed small bowel. Subcentimeter hypoattenuating focus in the right hepatic lobe is too small characterize. Gallbladder sludge and or stones. - US ABD 3/11/22 - Visualized liver unremarkable. . Unremarkable GB. No gallstones or biliary dilatation. - CT LIVER MULTIPHASIC W/WO CON 3/17 - There is a 1.3 x 2.1 cm low-density lesion ( 4 HU)  in the neck of the  pancreas abutting the portal mesenteric confluence. Small low density liver lesion are too small, but may represent cysts.     -Liver enzymes unremarkable    - Platelets 79  - Ammonia 29  - INR 1.3 > 1.1 > 1.4    - B12 and folate OK  - Iron sat 9%, TIBC 255, Ferritin 394  - CARLOS negative  - A1AT 178  - Acute hepatitis panel negative  - AFP <3  - ASMA negative  - AMA 4.5    - Colonoscopy 8/19/21 - Diverticulosis in the sigmoid colon and in the descending colon. Non-bleeding internal hemorrhoids. The examination was otherwise normal.    - No radiographic or serologic evidence of cirrhosis. All serologies, including AFP negative. - Subcentimeter hepatic foci on CT not well characterized on noncontrast study, likely benign cysts. - New finding of hypodense pancreatic neck lesion, possibly IPMN, though no PD dilatation. Will need an EUS/FNA v FNB when off of the IABP. DW the primary team and cardiology. Would likely need to r/o pancreatic malignancy prior to proceeding with LVAD. 4. Constipation   5. Large L RP and pelvic hematoma  - CT AP WO CON 3/21 - Large left retroperitoneal and pelvis hematoma measuring 19 cm. Sigmoid diverticulosis. Redemonstration pancreatic head mass. - AXR 3/23 - Prominent large bowel loops are seen in the mid abdomen, predominantly on the right. Stool is seen in the ascending colon. There is a paucity of bowel gas in the small bowel. No gas is seen in the rectum. - Hgb 9.5 > 8.3 > 7.7  - Glucose 148  - K 4.3  - Mag 2.4  - Phos 2.5    - On fentanyl gtt    - On senna/ miralax  - No emesis    - Increase miralax to BID  - Reglan 5mg BID x 4 doses  - Dulcolax [place] x 1  - Con't PPI and Add carafate via NGT BID x 5 days for GI ppx while NG to LIS  - OK for enteral feeds from GI standpoint when cleared by the ICU team  - Call if no BM despite the above. Case discussed with Dr. Sujatha Dove who is my collaborating physician for this encounter. No